# Patient Record
Sex: FEMALE | Race: BLACK OR AFRICAN AMERICAN | NOT HISPANIC OR LATINO | URBAN - METROPOLITAN AREA
[De-identification: names, ages, dates, MRNs, and addresses within clinical notes are randomized per-mention and may not be internally consistent; named-entity substitution may affect disease eponyms.]

---

## 2023-12-08 ENCOUNTER — INPATIENT (INPATIENT)
Facility: HOSPITAL | Age: 43
LOS: 6 days | Discharge: HOME | DRG: 95 | End: 2023-12-15
Attending: PSYCHIATRY & NEUROLOGY | Admitting: PSYCHIATRY & NEUROLOGY
Payer: COMMERCIAL

## 2023-12-08 VITALS
TEMPERATURE: 99 F | WEIGHT: 156.97 LBS | SYSTOLIC BLOOD PRESSURE: 127 MMHG | HEART RATE: 81 BPM | RESPIRATION RATE: 18 BRPM | DIASTOLIC BLOOD PRESSURE: 88 MMHG | OXYGEN SATURATION: 99 % | HEIGHT: 67 IN

## 2023-12-08 DIAGNOSIS — G62.9 POLYNEUROPATHY, UNSPECIFIED: ICD-10-CM

## 2023-12-08 LAB
A1C WITH ESTIMATED AVERAGE GLUCOSE RESULT: 5.6 % — SIGNIFICANT CHANGE UP (ref 4–5.6)
A1C WITH ESTIMATED AVERAGE GLUCOSE RESULT: 5.6 % — SIGNIFICANT CHANGE UP (ref 4–5.6)
ALBUMIN SERPL ELPH-MCNC: 4.5 G/DL — SIGNIFICANT CHANGE UP (ref 3.3–5)
ALBUMIN SERPL ELPH-MCNC: 4.5 G/DL — SIGNIFICANT CHANGE UP (ref 3.3–5)
ALP SERPL-CCNC: 70 U/L — SIGNIFICANT CHANGE UP (ref 40–120)
ALP SERPL-CCNC: 70 U/L — SIGNIFICANT CHANGE UP (ref 40–120)
ALT FLD-CCNC: 27 U/L — SIGNIFICANT CHANGE UP (ref 10–45)
ALT FLD-CCNC: 27 U/L — SIGNIFICANT CHANGE UP (ref 10–45)
ANION GAP SERPL CALC-SCNC: 17 MMOL/L — SIGNIFICANT CHANGE UP (ref 5–17)
ANION GAP SERPL CALC-SCNC: 17 MMOL/L — SIGNIFICANT CHANGE UP (ref 5–17)
AST SERPL-CCNC: 30 U/L — SIGNIFICANT CHANGE UP (ref 10–40)
AST SERPL-CCNC: 30 U/L — SIGNIFICANT CHANGE UP (ref 10–40)
BASOPHILS # BLD AUTO: 0.03 K/UL — SIGNIFICANT CHANGE UP (ref 0–0.2)
BASOPHILS # BLD AUTO: 0.03 K/UL — SIGNIFICANT CHANGE UP (ref 0–0.2)
BASOPHILS NFR BLD AUTO: 0.6 % — SIGNIFICANT CHANGE UP (ref 0–2)
BASOPHILS NFR BLD AUTO: 0.6 % — SIGNIFICANT CHANGE UP (ref 0–2)
BILIRUB SERPL-MCNC: 0.4 MG/DL — SIGNIFICANT CHANGE UP (ref 0.2–1.2)
BILIRUB SERPL-MCNC: 0.4 MG/DL — SIGNIFICANT CHANGE UP (ref 0.2–1.2)
BUN SERPL-MCNC: 17 MG/DL — SIGNIFICANT CHANGE UP (ref 7–23)
BUN SERPL-MCNC: 17 MG/DL — SIGNIFICANT CHANGE UP (ref 7–23)
CALCIUM SERPL-MCNC: 9.5 MG/DL — SIGNIFICANT CHANGE UP (ref 8.4–10.5)
CALCIUM SERPL-MCNC: 9.5 MG/DL — SIGNIFICANT CHANGE UP (ref 8.4–10.5)
CHLORIDE SERPL-SCNC: 105 MMOL/L — SIGNIFICANT CHANGE UP (ref 96–108)
CHLORIDE SERPL-SCNC: 105 MMOL/L — SIGNIFICANT CHANGE UP (ref 96–108)
CO2 SERPL-SCNC: 21 MMOL/L — LOW (ref 22–31)
CO2 SERPL-SCNC: 21 MMOL/L — LOW (ref 22–31)
CREAT SERPL-MCNC: 0.81 MG/DL — SIGNIFICANT CHANGE UP (ref 0.5–1.3)
CREAT SERPL-MCNC: 0.81 MG/DL — SIGNIFICANT CHANGE UP (ref 0.5–1.3)
EGFR: 92 ML/MIN/1.73M2 — SIGNIFICANT CHANGE UP
EGFR: 92 ML/MIN/1.73M2 — SIGNIFICANT CHANGE UP
EOSINOPHIL # BLD AUTO: 0.03 K/UL — SIGNIFICANT CHANGE UP (ref 0–0.5)
EOSINOPHIL # BLD AUTO: 0.03 K/UL — SIGNIFICANT CHANGE UP (ref 0–0.5)
EOSINOPHIL NFR BLD AUTO: 0.6 % — SIGNIFICANT CHANGE UP (ref 0–6)
EOSINOPHIL NFR BLD AUTO: 0.6 % — SIGNIFICANT CHANGE UP (ref 0–6)
ESTIMATED AVERAGE GLUCOSE: 114 MG/DL — SIGNIFICANT CHANGE UP (ref 68–114)
ESTIMATED AVERAGE GLUCOSE: 114 MG/DL — SIGNIFICANT CHANGE UP (ref 68–114)
FLUAV AG NPH QL: SIGNIFICANT CHANGE UP
FLUAV AG NPH QL: SIGNIFICANT CHANGE UP
FLUBV AG NPH QL: SIGNIFICANT CHANGE UP
FLUBV AG NPH QL: SIGNIFICANT CHANGE UP
GLUCOSE SERPL-MCNC: 102 MG/DL — HIGH (ref 70–99)
GLUCOSE SERPL-MCNC: 102 MG/DL — HIGH (ref 70–99)
HCT VFR BLD CALC: 42.8 % — SIGNIFICANT CHANGE UP (ref 34.5–45)
HCT VFR BLD CALC: 42.8 % — SIGNIFICANT CHANGE UP (ref 34.5–45)
HGB BLD-MCNC: 14 G/DL — SIGNIFICANT CHANGE UP (ref 11.5–15.5)
HGB BLD-MCNC: 14 G/DL — SIGNIFICANT CHANGE UP (ref 11.5–15.5)
IMM GRANULOCYTES NFR BLD AUTO: 0.2 % — SIGNIFICANT CHANGE UP (ref 0–0.9)
IMM GRANULOCYTES NFR BLD AUTO: 0.2 % — SIGNIFICANT CHANGE UP (ref 0–0.9)
LYMPHOCYTES # BLD AUTO: 2.12 K/UL — SIGNIFICANT CHANGE UP (ref 1–3.3)
LYMPHOCYTES # BLD AUTO: 2.12 K/UL — SIGNIFICANT CHANGE UP (ref 1–3.3)
LYMPHOCYTES # BLD AUTO: 45.7 % — HIGH (ref 13–44)
LYMPHOCYTES # BLD AUTO: 45.7 % — HIGH (ref 13–44)
MAGNESIUM SERPL-MCNC: 2.2 MG/DL — SIGNIFICANT CHANGE UP (ref 1.6–2.6)
MAGNESIUM SERPL-MCNC: 2.2 MG/DL — SIGNIFICANT CHANGE UP (ref 1.6–2.6)
MCHC RBC-ENTMCNC: 29.1 PG — SIGNIFICANT CHANGE UP (ref 27–34)
MCHC RBC-ENTMCNC: 29.1 PG — SIGNIFICANT CHANGE UP (ref 27–34)
MCHC RBC-ENTMCNC: 32.7 GM/DL — SIGNIFICANT CHANGE UP (ref 32–36)
MCHC RBC-ENTMCNC: 32.7 GM/DL — SIGNIFICANT CHANGE UP (ref 32–36)
MCV RBC AUTO: 89 FL — SIGNIFICANT CHANGE UP (ref 80–100)
MCV RBC AUTO: 89 FL — SIGNIFICANT CHANGE UP (ref 80–100)
MONOCYTES # BLD AUTO: 0.46 K/UL — SIGNIFICANT CHANGE UP (ref 0–0.9)
MONOCYTES # BLD AUTO: 0.46 K/UL — SIGNIFICANT CHANGE UP (ref 0–0.9)
MONOCYTES NFR BLD AUTO: 9.9 % — SIGNIFICANT CHANGE UP (ref 2–14)
MONOCYTES NFR BLD AUTO: 9.9 % — SIGNIFICANT CHANGE UP (ref 2–14)
NEUTROPHILS # BLD AUTO: 1.99 K/UL — SIGNIFICANT CHANGE UP (ref 1.8–7.4)
NEUTROPHILS # BLD AUTO: 1.99 K/UL — SIGNIFICANT CHANGE UP (ref 1.8–7.4)
NEUTROPHILS NFR BLD AUTO: 43 % — SIGNIFICANT CHANGE UP (ref 43–77)
NEUTROPHILS NFR BLD AUTO: 43 % — SIGNIFICANT CHANGE UP (ref 43–77)
NRBC # BLD: 0 /100 WBCS — SIGNIFICANT CHANGE UP (ref 0–0)
NRBC # BLD: 0 /100 WBCS — SIGNIFICANT CHANGE UP (ref 0–0)
PHOSPHATE SERPL-MCNC: 4.1 MG/DL — SIGNIFICANT CHANGE UP (ref 2.5–4.5)
PHOSPHATE SERPL-MCNC: 4.1 MG/DL — SIGNIFICANT CHANGE UP (ref 2.5–4.5)
PLATELET # BLD AUTO: 249 K/UL — SIGNIFICANT CHANGE UP (ref 150–400)
PLATELET # BLD AUTO: 249 K/UL — SIGNIFICANT CHANGE UP (ref 150–400)
POTASSIUM SERPL-MCNC: 3.7 MMOL/L — SIGNIFICANT CHANGE UP (ref 3.5–5.3)
POTASSIUM SERPL-MCNC: 3.7 MMOL/L — SIGNIFICANT CHANGE UP (ref 3.5–5.3)
POTASSIUM SERPL-SCNC: 3.7 MMOL/L — SIGNIFICANT CHANGE UP (ref 3.5–5.3)
POTASSIUM SERPL-SCNC: 3.7 MMOL/L — SIGNIFICANT CHANGE UP (ref 3.5–5.3)
PROCALCITONIN SERPL-MCNC: 0.04 NG/ML — SIGNIFICANT CHANGE UP (ref 0.02–0.1)
PROCALCITONIN SERPL-MCNC: 0.04 NG/ML — SIGNIFICANT CHANGE UP (ref 0.02–0.1)
PROT SERPL-MCNC: 7.6 G/DL — SIGNIFICANT CHANGE UP (ref 6–8.3)
PROT SERPL-MCNC: 7.6 G/DL — SIGNIFICANT CHANGE UP (ref 6–8.3)
RBC # BLD: 4.81 M/UL — SIGNIFICANT CHANGE UP (ref 3.8–5.2)
RBC # BLD: 4.81 M/UL — SIGNIFICANT CHANGE UP (ref 3.8–5.2)
RBC # FLD: 13.2 % — SIGNIFICANT CHANGE UP (ref 10.3–14.5)
RBC # FLD: 13.2 % — SIGNIFICANT CHANGE UP (ref 10.3–14.5)
RSV RNA NPH QL NAA+NON-PROBE: SIGNIFICANT CHANGE UP
RSV RNA NPH QL NAA+NON-PROBE: SIGNIFICANT CHANGE UP
SARS-COV-2 RNA SPEC QL NAA+PROBE: SIGNIFICANT CHANGE UP
SARS-COV-2 RNA SPEC QL NAA+PROBE: SIGNIFICANT CHANGE UP
SODIUM SERPL-SCNC: 143 MMOL/L — SIGNIFICANT CHANGE UP (ref 135–145)
SODIUM SERPL-SCNC: 143 MMOL/L — SIGNIFICANT CHANGE UP (ref 135–145)
TSH SERPL-MCNC: 1.53 UIU/ML — SIGNIFICANT CHANGE UP (ref 0.27–4.2)
TSH SERPL-MCNC: 1.53 UIU/ML — SIGNIFICANT CHANGE UP (ref 0.27–4.2)
WBC # BLD: 4.64 K/UL — SIGNIFICANT CHANGE UP (ref 3.8–10.5)
WBC # BLD: 4.64 K/UL — SIGNIFICANT CHANGE UP (ref 3.8–10.5)
WBC # FLD AUTO: 4.64 K/UL — SIGNIFICANT CHANGE UP (ref 3.8–10.5)
WBC # FLD AUTO: 4.64 K/UL — SIGNIFICANT CHANGE UP (ref 3.8–10.5)

## 2023-12-08 PROCEDURE — 99285 EMERGENCY DEPT VISIT HI MDM: CPT

## 2023-12-08 RX ORDER — ENOXAPARIN SODIUM 100 MG/ML
40 INJECTION SUBCUTANEOUS EVERY 24 HOURS
Refills: 0 | Status: DISCONTINUED | OUTPATIENT
Start: 2023-12-08 | End: 2023-12-11

## 2023-12-08 NOTE — ED PROVIDER NOTE - ATTENDING CONTRIBUTION TO CARE
------------ATTENDING NOTE------------   pt c/o 2 wks of constant gradually worsening bilateral numbness/paresthesia to lower extremities, no pain, feels "weird" w/ slight difficulty walking, no subjective weakness, no additional loss of function, no recent fevers/illness, 5/5 w/ +2 DTR BLE w/ nml gait, no DM or significant alcohol use or limited diet, awaiting labs/Neuro consult -->  - Natan Shaffer MD   --------------------------------------------- ------------ATTENDING NOTE------------   pt c/o 2 wks of constant gradually worsening bilateral numbness/paresthesia to lower extremities, no pain, feels "weird" w/ slight difficulty walking, no subjective weakness, no additional loss of function, no recent fevers/illness, 5/5 w/ +2 DTR BLE w/ nml gait, no DM or significant alcohol use or limited diet, awaiting labs/Neuro consult --> remained stable, admitting for continued filiberto hall, optimize medical mgmt, Neuro recs.  - Natan Shaffer MD   ---------------------------------------------

## 2023-12-08 NOTE — H&P ADULT - NSHPLABSRESULTS_GEN_ALL_CORE
Labs:                        14.0   4.64  )-----------( 249      ( 08 Dec 2023 15:36 )             42.8     12-08    143  |  105  |  17  ----------------------------<  102<H>  3.7   |  21<L>  |  0.81    Ca    9.5      08 Dec 2023 15:36  Phos  4.1     12-08  Mg     2.2     12-08    TPro  7.6  /  Alb  4.5  /  TBili  0.4  /  DBili  x   /  AST  30  /  ALT  27  /  AlkPhos  70  12-08    CAPILLARY BLOOD GLUCOSE        LIVER FUNCTIONS - ( 08 Dec 2023 15:36 )  Alb: 4.5 g/dL / Pro: 7.6 g/dL / ALK PHOS: 70 U/L / ALT: 27 U/L / AST: 30 U/L / GGT: x               CSF:                  Radiology:

## 2023-12-08 NOTE — ED PROVIDER NOTE - OBJECTIVE STATEMENT
44 y/o F PMHx GBS (26 years ago) presenting for b/l tingling in her feet and numbness in her hands for the past week. Patient states she saw a neurologist Dr. Tomasa Branham, DO today who sent her in to the ER for "GBS r/o" (per the paper prescription brought in by the patient). I left a message with her office for more information as to what testing she wanted done. Patient states this feels similar to the beginning of her GBS many years ago, however she states she does not have any weakness right now, but believes weakness only came on later last time after the sensation in her feet spread further.   Family history of T2DM in her father with diabetic nephropathy and in mother.   Denies any recent illnesses, fevers.  Patient denies any fevers, chills, nausea, vomiting, chest pain, diarrhea, constipation, painful urination, new rashes.

## 2023-12-08 NOTE — ED PROVIDER NOTE - PHYSICAL EXAMINATION
GENERAL: NAD  HEENT:  Atraumatic  CHEST/LUNG: Chest rise equal bilaterally  HEART: Regular rate and rhythm  ABDOMEN: Soft, Nontender, Nondistended  EXTREMITIES:  Extremities warm  PSYCH: A&Ox3  SKIN: No obvious rashes or lesions  MSK: No cervical spine TTP, able to range neck to the left and right/ No midline spinal TTP/ No swelling, redness, pain or discharge from   NEUROLOGY: strength and sensation intact in all extremities. CN 2 - 12 intact. Finger to nose test intact. No pronator drift. Ambulatory without difficulty.

## 2023-12-08 NOTE — H&P ADULT - NSICDXPASTMEDICALHX_GEN_ALL_CORE_FT
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

## 2023-12-08 NOTE — ED PROVIDER NOTE - PROGRESS NOTE DETAILS
Dakota Garcia DO (PGY-1) Neuro consulted dt cf GBS Dakota Garcia DO (PGY-1) Patient reevaluated at bedside. Patient is doing well. Neuro will admit patient to their service. Patient notified and amenable with plan.

## 2023-12-08 NOTE — H&P ADULT - ASSESSMENT
Ileana Santana is a 43-year-old woman, with PMH significant for AIDP and abnormal Pap smear - treated with LEEP, who presents to [facility]  ED on 12/8/2023, at the behest of her outpatient neurologist, with c/o b/l tingling and numbness in her feet and fingertips. 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 Reports current symptoms are similar to her AIDP symptoms. Previous course of AIDP began with tingling/numbness in her feet. Despite early use of PLEX - patient progressed to have further leg numbness/weakness, as well as arm weakness. No respiratory involvement per patient report. Denies recent illness including diarrheal illness. No recent vaccines. Not on any medications per her report. ED vitals notable for: Tmax 37.6, HR to 81, BP to 142/83, RR 18, SpO2 % on RA. Labs thus far are unrevealing. Exam notable for impaired STM, abnormal plantar response b/l, c/o reduced sensation to LT/pinprick + paresthesias: feet, legs, fingertips. No ophthalmoplegia, ataxia, reflexes are largely intact.    Impression: Paresthesias/numbness affecting the bilateral hands/feet in a patient with previous diagnosis of AIDP (treated with plasma exchange) - r/o AIDP, r/o other demyelinating disease    Plan:  [] Admit to [facility] Neurology Floor  [] Baseline NIF/VC  [] Labs: CBC, CMP, ganglioside Abs panel, paraneoplastic autoantibody panel, A1c, lipid panel, UA, UTox, ferritin, total iron with TIBC, ACE, vitamin B1/B6/B12/D/E, folate, Lyme, WNV, copper, zinc, CK, ESR, CRP, TSH, T3/T4, RPR, RVP  [] Follow up urine HCG  [] Imaging: MR C/T/L spine w/wo IV contrast  [] Based on laboratory and radiologic findings, will consider lumbar puncture  [] Reached out to Dr. Wong's office - left message. If no response this weekend, please reach out on Monday with an update.  [] PT/OT  [] CM/SW  [] Diet: Regular  [] DVT prophylaxis: enoxaparin 40mg subcutaneous q24h    Patient/plan d/w Dr. Baird. To be seen by General Neurology attending in the AM with attestation to follow. Plan is not formalized until attending attestation is complete. Ileana Santana is a 43-year-old woman, with PMH significant for AIDP and abnormal Pap smear - treated with LEEP, who presents to [facility]  ED on 12/8/2023, at the behest of her outpatient neurologist, with c/o b/l tingling and numbness in her feet and fingertips. 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 Reports current symptoms are similar to her AIDP symptoms. Previous course of AIDP began with tingling/numbness in her feet. Despite early use of PLEX - patient progressed to have further leg numbness/weakness, as well as arm weakness. No respiratory involvement per patient report. Denies recent illness including diarrheal illness. No recent vaccines. Not on any medications per her report. ED vitals notable for: Tmax 37.6, HR to 81, BP to 142/83, RR 18, SpO2 % on RA. Labs thus far are unrevealing. Exam notable for impaired STM, abnormal plantar response b/l, c/o reduced sensation to LT/pinprick + paresthesias: feet, legs, fingertips. No ophthalmoplegia, ataxia, reflexes are largely intact.    Impression: Paresthesias/numbness affecting the bilateral hands/feet in a patient with previous diagnosis of AIDP (treated with plasma exchange) - r/o AIDP, r/o other demyelinating disease    Plan:  [] Admit to [facility] Neurology Floor  [] Baseline NIF/VC  [] Labs: CBC, CMP, ganglioside Abs panel, paraneoplastic autoantibody panel, A1c, lipid panel, UA, UTox, ferritin, total iron with TIBC, ACE, vitamin B1/B6/B12/D/E, folate, Lyme, WNV, copper, zinc, CK, ESR, CRP, TSH, T3/T4, RPR, RVP  [] Follow up urine HCG  [] Imaging: MR C/T/L spine w/wo IV contrast  [] Based on laboratory and radiologic findings, will consider lumbar puncture  [] Reached out to Dr. Wong's office - left message. If no response this weekend, please reach out on Monday with an update.  [] PT/OT  [] CM/SW  [] Diet: Regular  [] DVT prophylaxis: enoxaparin 40mg subcutaneous q24h    Patient/plan d/w Dr. Baird. To be seen by General Neurology attending in the AM with attestation to follow. Plan is not formalized until attending attestation is complete.

## 2023-12-08 NOTE — ED ADULT NURSE NOTE - NSFALLUNIVINTERV_ED_ALL_ED
Bed/Stretcher in lowest position, wheels locked, appropriate side rails in place/Call bell, personal items and telephone in reach/Instruct patient to call for assistance before getting out of bed/chair/stretcher/Non-slip footwear applied when patient is off stretcher/[place] to call system/Physically safe environment - no spills, clutter or unnecessary equipment/Purposeful proactive rounding/Room/bathroom lighting operational, light cord in reach Bed/Stretcher in lowest position, wheels locked, appropriate side rails in place/Call bell, personal items and telephone in reach/Instruct patient to call for assistance before getting out of bed/chair/stretcher/Non-slip footwear applied when patient is off stretcher/[place] to call system/Physically safe environment - no spills, clutter or unnecessary equipment/Purposeful proactive rounding/Room/bathroom lighting operational, light cord in reach

## 2023-12-08 NOTE — ED PROVIDER NOTE - INTERPRETATION
normal sinus rhythm, Normal axis, Normal MT interval and QRS complex. There are no acute ischemic ST or T-wave changes. normal sinus rhythm, Normal axis, Normal DC interval and QRS complex. There are no acute ischemic ST or T-wave changes.

## 2023-12-08 NOTE — ED ADULT NURSE NOTE - OBJECTIVE STATEMENT
Pt is a 43y F p/w b/l LE tingling. Pt reports b/l LE tingling progressing up lower extremities x 1 week with some pain on ambulation. Pt also endorsing slightly decreased sensation and tingling in b/l fingertips. Pt has a remote h/o guillain barre which required intubation at 17 years of age. Reporting mild ALONZO. Denies fevers, recent viral illness, N/V/D, muscle spasms, cough. A&Ox4, PETERS, lungs clear, distal pulses intact, abdomen soft, skin intact. Side rails up for safety, call bell and personal items within reach, instructed to call for assistance, verbalizes understanding. Will continue to monitor.

## 2023-12-08 NOTE — ED PROVIDER NOTE - CLINICAL SUMMARY MEDICAL DECISION MAKING FREE TEXT BOX
42 y/o F PMHx GBS (26 years ago) presenting for b/l tingling in her feet and numbness in her hands for the past week. Patient states she saw a neurologist Dr. Tomasa Branham, DO today who sent her in to the ER for "GBS r/o" (per the paper prescription brought in by the patient). I left a message with her office for more information as to what testing she wanted done. Patient states this feels similar to the beginning of her GBS many years ago, however she states she does not have any weakness right now, but believes weakness only came on later last time after the sensation in her feet spread further.   Denies any recent illnesses, fevers.  VSS  On exam patient has neuro exam wnl with sensation intact in hands and feet DTRs intact.   DDx includes but not limited to peripheral neuropathy. 44 y/o F PMHx GBS (26 years ago) presenting for b/l tingling in her feet and numbness in her hands for the past week. Patient states she saw a neurologist Dr. Tomasa Branham, DO today who sent her in to the ER for "GBS r/o" (per the paper prescription brought in by the patient). I left a message with her office for more information as to what testing she wanted done. Patient states this feels similar to the beginning of her GBS many years ago, however she states she does not have any weakness right now, but believes weakness only came on later last time after the sensation in her feet spread further.   Denies any recent illnesses, fevers.  VSS  On exam patient has neuro exam wnl with sensation intact in hands and feet DTRs intact.   DDx includes but not limited to peripheral neuropathy vs GBS 42 y/o F PMHx GBS (26 years ago) presenting for b/l tingling in her feet and numbness in her hands for the past week. Patient states she saw a neurologist Dr. Tomasa Branham, DO today who sent her in to the ER for "GBS r/o" (per the paper prescription brought in by the patient). I left a message with her office for more information as to what testing she wanted done. Patient states this feels similar to the beginning of her GBS many years ago, however she states she does not have any weakness right now, but believes weakness only came on later last time after the sensation in her feet spread further.   Denies any recent illnesses, fevers.  VSS  On exam patient has neuro exam wnl with sensation intact in hands and feet DTRs intact.   DDx includes but not limited to peripheral neuropathy vs GBS see MD note

## 2023-12-08 NOTE — H&P ADULT - NSHPPHYSICALEXAM_GEN_ALL_CORE
Vitals:  T(C): 37.6 (12-08-23 @ 15:16), Max: 37.6 (12-08-23 @ 15:16)  HR: 66 (12-08-23 @ 15:16) (66 - 81)  BP: 142/83 (12-08-23 @ 15:16) (127/88 - 142/83)  RR: 18 (12-08-23 @ 15:16) (18 - 18)  SpO2: 100% (12-08-23 @ 15:16) (99% - 100%)    Physical Examination:  General - Sitting up on edge of ED cart, on her phone, NAD, appears younger than stated age  Cardiovascular - No LE edema, extremities are warm/well-perfused  Eyes - Non-injected conjunctiva, anicteric sclera    Neurologic Exam:  Mental status:  Awake, Alert; Oriented to: person, place, and time; Speech: fluent; Repetition and naming: intact; Follows simple commands, performed 3-step command incorrectly; Attention/concentration: spells WORLD correctly forward - struggles to spell backward but eventually does successfully; Recent and remote memory (including registration and recall): registration intact, 1/3 on 3-word recall - even after category cues; Fund of knowledge: knows current president, knows COVID-19, struggles to tell me how many quarters in $1.75.    Cranial nerves - PERRL, VFF on confrontational testing, EOMI - no nystagmus, no pain with movements, face sensation (V1-V3) intact b/l, facial strength intact without asymmetry b/l, hearing intact b/l with finger rub test, palate with symmetric elevation, trapezius 5/5 strength b/l, tongue midline on protrusion with full lateral movement    Motor - Normal bulk and tone throughout. No pronator drift.  Strength testing (R/L)  Deltoid:  5/5   Biceps:  5/5   Triceps:  5/5   Wrist Extension:  5/5   Wrist Flexion:  5/5   Interossei:  5/5   :  5/5  Hip Flexion:  5/5   Hip Extension:  5/5   Knee Flexion:  5/5   Knee Extension:  5/5   Dorsiflexion:  5/5   Plantar Flexion:  5/5    Sensation - Light touch/vibration intact throughout - except feet, legs, fingertips, which are with reduced sensation to light touch and pinprick    DTRs (R/L)  Biceps:  2+/2+   Triceps:  2+/2+   Brachioradialis:  2+/2+   Patellar:  2+/2+   Ankle:  0/0   Toes/plantar response:  Up/Up    Coordination - Finger to Nose, Heel to Shin, JONATHAN intact b/l. No tremors appreciated.    Gait and station - Normal casual gait. Reported some unsteadiness while performing Romberg test.

## 2023-12-08 NOTE — ED ADULT NURSE NOTE - CHIEF COMPLAINT QUOTE
tingling bilat feet tips of finger tips since Thursday last week Has H/O Guillain [place] at 17 yrs of age  Amb to ED Resp even and nonlab  Worsening pain upon ambulation tingling bilat feet tips of finger tips since Thursday last week Has H/O Guillain [place] at 17 yrs of age  Amb to ED Resp even and nonlab  Worsening pain upon ambulation

## 2023-12-08 NOTE — H&P ADULT - HISTORY OF PRESENT ILLNESS
Patient MICHELLE ARIAS is a 43y (1980) RIGHT handed woman who presented to [facility] ED on 12/8/2023, with c/o tingling in the b/l fingertips and feet since Thursday (11/30/2023).    PMH significant for: AIDP (age 17, when pregnant, treated with plasma exchange), history of abnormal Pap smear s/p LEEP    Patient reports to [facility] emergency department at the behest of her outpatient neurologist, Dr. Wong.  Patient reports that today was her first visit with Dr. Wong.  Patient had seen her primary care physician earlier in the week and s/he had referred her to an outpatient neurologist.  This neurologist was unable to see patient until 1/2024, so she found Dr. Wong online.  Patient is seeing an outpatient neurologist because she has been having numbness and tingling in her feet and hands, ongoing since Thursday (11/30/2023) of last week.  Patient reports that she started her menses on 11/30 and was dealing with cramping.  She called off from work and was in bed all day.  She says she began feeling numbness and tingling in her feet, namely in her toes.  She describes this as reduced sensation as well as pins-and-needles.  She says the sensation is akin to your foot falling asleep and trying to wake up.  Patient says that since then she has had worsening numbness on the plantar surfaces of her feet, the dorsal surfaces of her feet, and she is beginning to feel numbness in the lower legs.  She says that the toes are the worst, followed by the plantar surfaces of her feet, then the dorsal surfaces, and then the legs.  Patient says earlier this week she began feeling numbness and tingling in the tips of her fingers. Reports that her feet feel cold. Symptoms are during the entire day - not worse at night.    Patient is particularly concerned about her symptoms because she had a diagnosis of GBS at age 17.  Patient reports that the suspected trigger was pregnancy.  The pregnancy was ultimately terminated.  Patient reports that she underwent blood work, MRI, lumbar puncture.  She was admitted to the hospital for a week and a half, where she underwent plasma exchange.  She reports that she then spent 1.5 months in physical therapy to fully recover. Says she has always had balance issues since the initial episode.  She reports that her symptoms of the initial episode began similarly with numbness and tingling in her feet.  She began plasma exchange relatively quickly.  However, she reports that by the end of the plasma exchange, the numbness had spread up her legs and she was appreciably weak in both of her legs and her arms after plasma exchange.  Patient reports that there is no chance that she could be pregnant right now.      Patient denies a history of any medical problems other than a history of AIDP and abnormal Pap treated with LEEP procedure. Denies history of stroke/MI.  Reports she does not take any medications.  She denies use of blood thinners or aspirin.  She does not use any assistive devices at baseline.  She is able to climb stairs without issues.  Patient reports that she drives.  Patient does not use tobacco.  Patient drinks socially.  Patient denies use of recreational drugs.  Patient reports that she lives alone.  She has no children.  Patient works as a  at [employer]. Patient denies a family history of AIDP.  There is no history of demyelinating diseases in her family to her knowledge.  She denies family history of rheumatologic issues.    Patient denies any recent illness including diarrheal illness.  She has not received any recent vaccines.  She has not had any recent travel, no sick contacts.  No changes in medications.  Patient reports that she had a LEEP procedure earlier this year for an abnormal Pap smear, otherwise she has had no new medical issues prior to this past week.  She does not regularly see a physician.   Patient denies fevers and chills. Denies headache, neck pain, chest pain, shortness of breath, abdominal pain, nausea, vomiting, diarrhea, dysuria, frequency, hematuria.  Reports normal bowel movements.  Reports normal urination. Patient MICHELLE ARIAS is a 43y (1980) RIGHT handed woman who presented to [facility] ED on 12/8/2023, with c/o tingling in the b/l fingertips and feet since Thursday (11/30/2023).    PMH significant for: AIDP (age 17, when pregnant, treated with plasma exchange), history of abnormal Pap smear s/p LEEP    Patient reports to [facility] emergency department at the behest of her outpatient neurologist, Dr. Wong.  Patient reports that today was her first visit with Dr. Wong.  Patient had seen her primary care physician earlier in the week and s/he had referred her to an outpatient neurologist.  This neurologist was unable to see patient until 1/2024, so she found Dr. Wong online.  Patient is seeing an outpatient neurologist because she has been having numbness and tingling in her feet and hands, ongoing since Thursday (11/30/2023) of last week.  Patient reports that she started her menses on 11/30 and was dealing with cramping.  She called off from work and was in bed all day.  She says she began feeling numbness and tingling in her feet, namely in her toes.  She describes this as reduced sensation as well as pins-and-needles.  She says the sensation is akin to your foot falling asleep and trying to wake up.  Patient says that since then she has had worsening numbness on the plantar surfaces of her feet, the dorsal surfaces of her feet, and she is beginning to feel numbness in the lower legs.  She says that the toes are the worst, followed by the plantar surfaces of her feet, then the dorsal surfaces, and then the legs.  Patient says earlier this week she began feeling numbness and tingling in the tips of her fingers. Reports that her feet feel cold. Symptoms are during the entire day - not worse at night.    Patient is particularly concerned about her symptoms because she had a diagnosis of GBS at age 17.  Patient reports that the suspected trigger was pregnancy.  The pregnancy was ultimately terminated.  Patient reports that she underwent blood work, MRI, lumbar puncture.  She was admitted to the hospital for a week and a half, where she underwent plasma exchange.  She reports that she then spent 1.5 months in physical therapy to fully recover. Says she has always had balance issues since the initial episode.  She reports that her symptoms of the initial episode began similarly with numbness and tingling in her feet.  She began plasma exchange relatively quickly.  However, she reports that by the end of the plasma exchange, the numbness had spread up her legs and she was appreciably weak in both of her legs and her arms after plasma exchange.  Patient reports that there is no chance that she could be pregnant right now.      Patient denies a history of any medical problems other than a history of AIDP and abnormal Pap treated with LEEP procedure. Denies history of stroke/MI.  Reports she does not take any medications.  She denies use of blood thinners or aspirin.  She does not use any assistive devices at baseline.  She is able to climb stairs without issues.  Patient reports that she drives.  Patient does not use tobacco.  Patient drinks socially.  Patient denies use of recreational drugs.  Patient reports that she lives alone.  She has no children.  Patient works as a  at [employer]. Patient denies a family history of AIDP.  There is no history of demyelinating diseases in her family to her knowledge.  She denies family history of rheumatologic issues.    Patient denies any recent illness including diarrheal illness.  She has not received any recent vaccines.  She has not had any recent travel, no sick contacts.  No changes in medications.  Patient reports that she had a LEEP procedure earlier this year for an abnormal Pap smear, otherwise she has had no new medical issues prior to this past week.  She does not regularly see a physician.   Patient denies fevers and chills. Denies headache, neck pain, chest pain, shortness of breath, abdominal pain, nausea, vomiting, diarrhea, dysuria, frequency, hematuria.  Reports normal bowel movements.  Reports normal urination.

## 2023-12-08 NOTE — ED ADULT TRIAGE NOTE - CHIEF COMPLAINT QUOTE
tingling bilat feet tips of finger tips since Thursday last week Has H/O Jesse Aparicio at 17 yrs of age  Amb to ED Resp even and nonlab  Worsening pain upon ambulation tingling bilat feet tips of finger tips since Thursday last week Has H/O Guillain [place] at 17 yrs of age  Amb to ED Resp even and nonlab  Worsening pain upon ambulation tingling bilat feet tips of finger tips since Thursday last week Has H/O Guillain [place] at 17 yrs of age  Amb to ED Resp even and nonlab  Worsening pain upon ambulation

## 2023-12-09 LAB
AMPHET UR-MCNC: NEGATIVE — SIGNIFICANT CHANGE UP
AMPHET UR-MCNC: NEGATIVE — SIGNIFICANT CHANGE UP
APPEARANCE UR: CLEAR — SIGNIFICANT CHANGE UP
APPEARANCE UR: CLEAR — SIGNIFICANT CHANGE UP
BACTERIA # UR AUTO: ABNORMAL /HPF
BACTERIA # UR AUTO: ABNORMAL /HPF
BARBITURATES UR SCN-MCNC: NEGATIVE — SIGNIFICANT CHANGE UP
BARBITURATES UR SCN-MCNC: NEGATIVE — SIGNIFICANT CHANGE UP
BENZODIAZ UR-MCNC: NEGATIVE — SIGNIFICANT CHANGE UP
BENZODIAZ UR-MCNC: NEGATIVE — SIGNIFICANT CHANGE UP
BILIRUB UR-MCNC: NEGATIVE — SIGNIFICANT CHANGE UP
BILIRUB UR-MCNC: NEGATIVE — SIGNIFICANT CHANGE UP
CAST: 0 /LPF — SIGNIFICANT CHANGE UP (ref 0–4)
CAST: 0 /LPF — SIGNIFICANT CHANGE UP (ref 0–4)
COCAINE METAB.OTHER UR-MCNC: NEGATIVE — SIGNIFICANT CHANGE UP
COCAINE METAB.OTHER UR-MCNC: NEGATIVE — SIGNIFICANT CHANGE UP
COLOR SPEC: YELLOW — SIGNIFICANT CHANGE UP
COLOR SPEC: YELLOW — SIGNIFICANT CHANGE UP
DIFF PNL FLD: ABNORMAL
DIFF PNL FLD: ABNORMAL
GLUCOSE UR QL: NEGATIVE MG/DL — SIGNIFICANT CHANGE UP
GLUCOSE UR QL: NEGATIVE MG/DL — SIGNIFICANT CHANGE UP
HCG UR QL: NEGATIVE — SIGNIFICANT CHANGE UP
HCG UR QL: NEGATIVE — SIGNIFICANT CHANGE UP
KETONES UR-MCNC: NEGATIVE MG/DL — SIGNIFICANT CHANGE UP
KETONES UR-MCNC: NEGATIVE MG/DL — SIGNIFICANT CHANGE UP
LEUKOCYTE ESTERASE UR-ACNC: ABNORMAL
LEUKOCYTE ESTERASE UR-ACNC: ABNORMAL
METHADONE UR-MCNC: NEGATIVE — SIGNIFICANT CHANGE UP
METHADONE UR-MCNC: NEGATIVE — SIGNIFICANT CHANGE UP
NITRITE UR-MCNC: NEGATIVE — SIGNIFICANT CHANGE UP
NITRITE UR-MCNC: NEGATIVE — SIGNIFICANT CHANGE UP
OPIATES UR-MCNC: NEGATIVE — SIGNIFICANT CHANGE UP
OPIATES UR-MCNC: NEGATIVE — SIGNIFICANT CHANGE UP
OXYCODONE UR-MCNC: NEGATIVE — SIGNIFICANT CHANGE UP
OXYCODONE UR-MCNC: NEGATIVE — SIGNIFICANT CHANGE UP
PCP SPEC-MCNC: SIGNIFICANT CHANGE UP
PCP SPEC-MCNC: SIGNIFICANT CHANGE UP
PCP UR-MCNC: NEGATIVE — SIGNIFICANT CHANGE UP
PCP UR-MCNC: NEGATIVE — SIGNIFICANT CHANGE UP
PH UR: 6 — SIGNIFICANT CHANGE UP (ref 5–8)
PH UR: 6 — SIGNIFICANT CHANGE UP (ref 5–8)
PROT UR-MCNC: NEGATIVE MG/DL — SIGNIFICANT CHANGE UP
PROT UR-MCNC: NEGATIVE MG/DL — SIGNIFICANT CHANGE UP
RBC CASTS # UR COMP ASSIST: 6 /HPF — HIGH (ref 0–4)
RBC CASTS # UR COMP ASSIST: 6 /HPF — HIGH (ref 0–4)
REVIEW: SIGNIFICANT CHANGE UP
REVIEW: SIGNIFICANT CHANGE UP
SP GR SPEC: 1.02 — SIGNIFICANT CHANGE UP (ref 1–1.03)
SP GR SPEC: 1.02 — SIGNIFICANT CHANGE UP (ref 1–1.03)
SQUAMOUS # UR AUTO: 6 /HPF — HIGH (ref 0–5)
SQUAMOUS # UR AUTO: 6 /HPF — HIGH (ref 0–5)
THC UR QL: NEGATIVE — SIGNIFICANT CHANGE UP
THC UR QL: NEGATIVE — SIGNIFICANT CHANGE UP
UROBILINOGEN FLD QL: 0.2 MG/DL — SIGNIFICANT CHANGE UP (ref 0.2–1)
UROBILINOGEN FLD QL: 0.2 MG/DL — SIGNIFICANT CHANGE UP (ref 0.2–1)
WBC UR QL: 18 /HPF — HIGH (ref 0–5)
WBC UR QL: 18 /HPF — HIGH (ref 0–5)

## 2023-12-09 PROCEDURE — 99222 1ST HOSP IP/OBS MODERATE 55: CPT | Mod: GC

## 2023-12-09 PROCEDURE — 72157 MRI CHEST SPINE W/O & W/DYE: CPT | Mod: 26

## 2023-12-09 PROCEDURE — 72158 MRI LUMBAR SPINE W/O & W/DYE: CPT | Mod: 26

## 2023-12-09 PROCEDURE — 72156 MRI NECK SPINE W/O & W/DYE: CPT | Mod: 26

## 2023-12-09 RX ORDER — INFLUENZA VIRUS VACCINE 15; 15; 15; 15 UG/.5ML; UG/.5ML; UG/.5ML; UG/.5ML
0.5 SUSPENSION INTRAMUSCULAR ONCE
Refills: 0 | Status: DISCONTINUED | OUTPATIENT
Start: 2023-12-09 | End: 2023-12-15

## 2023-12-09 RX ADMIN — ENOXAPARIN SODIUM 40 MILLIGRAM(S): 100 INJECTION SUBCUTANEOUS at 06:43

## 2023-12-09 NOTE — CONSULT NOTE ADULT - ASSESSMENT
Patient is a 43 year old woman admitted 12/8/23 with  a over 1 week history since last Thursday, 11/30/23 of tingling involving the feet bilaterally which slowly progressed from the toes to the ankle. In addition after 3 days of tingling of the feet she noted tingling of the fingertips which also persisted. she denies weakness. She denies other neurological complaints. She denies neck, back, arm, or leg pain. She denies trauma, heavy lifting, fever.  She was seen by an outpatient neurologist, Dr. Wong and advised to go to the ER. Neurological exam as above.    1) MRI C Spine report pending  2) MRI T Spine report pending  3) MRI LS Spine report pending  4) ESR, C reactive protein,  KIESHA, RF, SPEP  5) TFT,  pending  6) ACE pending  7) Serum paraneoplastic panel pending  8) Rheumatology consult acute polyneuropathy  9) ObGyn consult abnormal PAP smear spring, 2023, HPV positive, S/P LEEP

## 2023-12-09 NOTE — PHYSICAL THERAPY INITIAL EVALUATION ADULT - PERTINENT HX OF CURRENT PROBLEM, REHAB EVAL
43-year-old woman, with PMH significant for AIDP and abnormal Pap smear - treated with LEEP, who presents to [facility]  ED on 12/8/2023, at the behest of her outpatient neurologist, with c/o b/l tingling and numbness in her feet and fingertips. 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 Reports current symptoms are similar to her AIDP symptoms. Previous course of AIDP began with tingling/numbness in her feet. Despite early use of PLEX - patient progressed to have further leg numbness/weakness, as well as arm weakness. No respiratory involvement per patient report. Denies recent illness including diarrheal illness. No recent vaccines. Not on any medications per her report. ED vitals notable for: Tmax 37.6, HR to 81, BP to 142/83, RR 18, SpO2 % on RA. Labs thus far are unrevealing. Exam notable for impaired STM, abnormal plantar response b/l, c/o reduced sensation to LT/pinprick + paresthesias: feet, legs, fingertips. No ophthalmoplegia, ataxia, reflexes are largely intact. Impression: Paresthesias/numbness affecting the bilateral hands/feet in a patient with previous diagnosis of AIDP (treated with plasma exchange) - r/o AIDP, r/o other demyelinating disease 43-year-old woman, with PMH significant for AIDP and abnormal Pap smear - treated with LEEP, who presents to [facility]  ED on 12/8/2023, at the behest of her outpatient neurologist, with c/o b/l tingling and numbness in her feet and fingertips. 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 Reports current symptoms are similar to her AIDP symptoms. Previous course of AIDP began with tingling/numbness in her feet. Despite early use of PLEX - patient progressed to have further leg numbness/weakness, as well as arm weakness. No respiratory involvement per patient report. Denies recent illness including diarrheal illness. No recent vaccines. Not on any medications per her report. ED vitals notable for: Tmax 37.6, HR to 81, BP to 142/83, RR 18, SpO2 % on RA. Labs thus far are unrevealing. Exam notable for impaired STM, abnormal plantar response b/l, c/o reduced sensation to LT/pinprick + paresthesias: feet, legs, fingertips. No ophthalmoplegia, ataxia, reflexes are largely intact. Impression: Paresthesias/numbness affecting the bilateral hands/feet in a patient with previous diagnosis of AIDP (treated with plasma exchange) - r/o AIDP, r/o other demyelinating disease

## 2023-12-09 NOTE — PATIENT PROFILE ADULT - FALL HARM RISK - UNIVERSAL INTERVENTIONS
Bed in lowest position, wheels locked, appropriate side rails in place/Call bell, personal items and telephone in reach/Instruct patient to call for assistance before getting out of bed or chair/Non-slip footwear when patient is out of bed/[place] to call system/Physically safe environment - no spills, clutter or unnecessary equipment/Purposeful Proactive Rounding/Room/bathroom lighting operational, light cord in reach Bed in lowest position, wheels locked, appropriate side rails in place/Call bell, personal items and telephone in reach/Instruct patient to call for assistance before getting out of bed or chair/Non-slip footwear when patient is out of bed/[place] to call system/Physically safe environment - no spills, clutter or unnecessary equipment/Purposeful Proactive Rounding/Room/bathroom lighting operational, light cord in reach

## 2023-12-09 NOTE — PATIENT PROFILE ADULT - NSPROGENSOURCEINFO_GEN_A_NUR
NP informed, repeat cbc and T/S ordered for midnight, will continue to monitor
NP aware. As per NP, await CBC with differential results prior to initiation Plt transfusion. Will continue to monitor.
Will continue to monitor as per NP request and pass along information to day RN.
continue on tele.
patient

## 2023-12-09 NOTE — PHYSICAL THERAPY INITIAL EVALUATION ADULT - ADDITIONAL COMMENTS
Pt was independent prior to admission living alone in pvt house FOS to bedroom, no dme, drives, works. Of note pt had Guillian Dallas at age 17 after pregnancy. Pt was independent prior to admission living alone in pvt house FOS to bedroom, no dme, drives, works. Of note pt had Guillian Brantley at age 17 after pregnancy.

## 2023-12-09 NOTE — CONSULT NOTE ADULT - ATTENDING COMMENTS
Likely GBS/AIDP and management for such per neurology   will check blood work for rheumatologic condition related to current presentation   Alma Chapman MD

## 2023-12-09 NOTE — CONSULT NOTE ADULT - SUBJECTIVE AND OBJECTIVE BOX
***consult has been received. note is in progress and incomplete without attending attestation***    Lise Phillip MD  PGY-4  Rheumatology Fellow  Reachable on TEAMS  [phone]    MICHELLE ARIAS  [id number]    HISTORY OF PRESENT ILLNESS:  43-year-old with history of GBS ( ~ 26 years ago) presented to [facility] ED on 2023, complaining of tingling in both fingertips and feet since Thursday (2023). 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  The patient has been experiencing persistent numbness and tingling in her feet and hands since 2023, coinciding with the onset of her menstrual cycle. The symptoms involve reduced sensation, pins-and-needles sensation, and a feeling similar to a foot falling asleep. The numbness has progressed from her toes to the plantar and dorsal surfaces of her feet, extending to the lower legs. Numbness has also developed in the fingertips, and the patient reports her feet feeling cold. Of particular concern to the patient is her history of Guillain-Barré Syndrome at age 17, suspected to be triggered by pregnancy, which was subsequently terminated. She underwent blood work, MRI, lumbar puncture, and plasma exchange during a week-and-a-half hospitalization, followed by 1.5 months of physical therapy for full recovery. The patient has experienced ongoing balance issues since the initial episode. During the GBS episode, numbness and tingling initially started in the feet, similar to the current presentation, but progressed rapidly after plasma exchange, leading to weakness in both legs and arms.  The patient denies a history of other medical problems, not on any medications. She works as a  at [employer]. She has no children, denies tobacco use, uses alcohol socially, and denies recreational drug use. There is no family history of autoimmune disease  The patient denies recent illness, recent vaccines, travel, sick contacts, and changes in medications. Denies fevers, chills, weight loss, night sweats, alopecia, sinus disease, asthma history, oral ulcers, dysphagia, vision changes/, Raynaud's, VTE, miscarriages sicca symptoms/urinary changes, edema, SOB, joint pain, swelling, jaw claudication/rash/photosensitivity/morning stiffness    MEDICATIONS  (STANDING):  enoxaparin Injectable 40 milliGRAM(s) SubCutaneous every 24 hours  influenza   Vaccine 0.5 milliLiter(s) IntraMuscular once    MEDICATIONS  (PRN):      Allergies    No Known Allergies    Intolerances        PERTINENT MEDICATION HISTORY:      Social History:  Works as a  at [employer]. Works from home. Lives alone. No children. (08 Dec 2023 17:06)      PAST MEDICAL & SURGICAL HISTORY:  GBS (Guillain-[place] syndrome)      No significant past surgical history    OCCUPATION:  TRAVEL HISTORY:    FAMILY HISTORY:      Vital Signs Last 24 Hrs  T(C): 36.9 (09 Dec 2023 17:05), Max: 36.9 (08 Dec 2023 23:58)  T(F): 98.4 (09 Dec 2023 17:05), Max: 98.5 (08 Dec 2023 23:58)  HR: 61 (09 Dec 2023 17:05) (58 - 68)  BP: 116/75 (09 Dec 2023 17:05) (115/76 - 130/85)  BP(mean): --  RR: 18 (09 Dec 2023 17:05) (18 - 18)  SpO2: 100% (09 Dec 2023 17:05) (97% - 100%)    Parameters below as of 09 Dec 2023 17:05  Patient On (Oxygen Delivery Method): room air      Physical Exam:  General: No apparent distress  HEENT: EOMI, MMM  CVS: +S1/S2, RRR, no murmurs/rubs/gallops  Resp: CTA b/l. No crackles/wheezing  GI: Soft, NT/ND +BS  MSK: no swelling/warmth/erythema of the joints of the UE/LE  Neuro: AAOx3, muscle exam normal, reduced sensation to light touch on feet, legs, fingertips,    Skin: no visible rashes    LABS:                        14.0   4.64  )-----------( 249      ( 08 Dec 2023 15:36 )             42.8     12-08    143  |  105  |  17  ----------------------------<  102<H>  3.7   |  21<L>  |  0.81    Ca    9.5      08 Dec 2023 15:36  Phos  4.1     12-08  Mg     2.2         TPro  7.6  /  Alb  4.5  /  TBili  0.4  /  DBili  x   /  AST  30  /  ALT  27  /  AlkPhos  70        Urinalysis Basic - ( 09 Dec 2023 18:20 )    Color: Yellow / Appearance: Clear / S.024 / pH: x  Gluc: x / Ketone: Negative mg/dL  / Bili: Negative / Urobili: 0.2 mg/dL   Blood: x / Protein: Negative mg/dL / Nitrite: Negative   Leuk Esterase: Moderate / RBC: x / WBC x   Sq Epi: x / Non Sq Epi: x / Bacteria: x      Rheumatology Work Up        RADIOLOGY & ADDITIONAL STUDIES:    < from: MR Lumbar Spine w/wo IV Cont (23 @ 11:28) >  Left foraminal disc herniation at the L5-S1 level.    < end of copied text >  < from: MR Thoracic Spine w/wo IV Cont (23 @ 11:27) >  IMPRESSION: No imaging evidence of Guillain [place] syndrome.    Left foraminal disc herniation at the L5-S1 level.      < end of copied text >      ?< from: MR Cervical Spine w/wo IV Cont (23 @ 11:27) >  IMPRESSION:    Abnormal enhancement along multiple bilateral cervical nerve roots with   dorsal distribution, thin/non-masslike and most consistent with a   polyneuritis.    Distribution is dorsal/sensory and fits symptoms as probable relapse of   GBS-sensory variant, versus other inflammatory condition. Correlate with   CSF sampling.    No cord compression, abnormal signal or definitive enhancement to   indicate myelitis.    < end of copied text >   ***consult has been received. note is in progress and incomplete without attending attestation***    Lise Phillip MD  PGY-4  Rheumatology Fellow  Re[employer] on TEAMS  [phone]    MICHELLE ARIAS  [id number]    HISTORY OF PRESENT ILLNESS:  43-year-old with history of GBS ( ~ 26 years ago) presented to [facility] ED on 2023, complaining of tingling in both fingertips and feet since Thursday (2023). 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  The patient has been experiencing persistent numbness and tingling in her feet and hands since 2023, coinciding with the onset of her menstrual cycle. The symptoms involve reduced sensation, pins-and-needles sensation, and a feeling similar to a foot falling asleep. The numbness has progressed from her toes to the plantar and dorsal surfaces of her feet, extending to the lower legs. Numbness has also developed in the fingertips, and the patient reports her feet feeling cold. Of particular concern to the patient is her history of Guillain-Barré Syndrome at age 17, suspected to be triggered by pregnancy, which was subsequently terminated. She underwent blood work, MRI, lumbar puncture, and plasma exchange during a week-and-a-half hospitalization, followed by 1.5 months of physical therapy for full recovery. The patient has experienced ongoing balance issues since the initial episode. During the GBS episode, numbness and tingling initially started in the feet, similar to the current presentation, but progressed rapidly after plasma exchange, leading to weakness in both legs and arms.  The patient denies a history of other medical problems, not on any medications. She works as a  at [employer]. She has no children, denies tobacco use, uses alcohol socially, and denies recreational drug use. There is no family history of autoimmune disease  The patient denies recent illness, recent vaccines, travel, sick contacts, and changes in medications. Denies fevers, chills, weight loss, night sweats, alopecia, sinus disease, asthma history, oral ulcers, dysphagia, vision changes/, Raynaud's, VTE, miscarriages sicca symptoms/urinary changes, edema, SOB, joint pain, swelling, jaw claudication/rash/photosensitivity/morning stiffness    MEDICATIONS  (STANDING):  enoxaparin Injectable 40 milliGRAM(s) SubCutaneous every 24 hours  influenza   Vaccine 0.5 milliLiter(s) IntraMuscular once    MEDICATIONS  (PRN):      Allergies    No Known Allergies    Intolerances        PERTINENT MEDICATION HISTORY:      Social History:  Works as a  at [employer]. Works from home. Lives alone. No children. (08 Dec 2023 17:06)      PAST MEDICAL & SURGICAL HISTORY:  GBS (Guillain-[place] syndrome)      No significant past surgical history    OCCUPATION:  TRAVEL HISTORY:    FAMILY HISTORY:      Vital Signs Last 24 Hrs  T(C): 36.9 (09 Dec 2023 17:05), Max: 36.9 (08 Dec 2023 23:58)  T(F): 98.4 (09 Dec 2023 17:05), Max: 98.5 (08 Dec 2023 23:58)  HR: 61 (09 Dec 2023 17:05) (58 - 68)  BP: 116/75 (09 Dec 2023 17:05) (115/76 - 130/85)  BP(mean): --  RR: 18 (09 Dec 2023 17:05) (18 - 18)  SpO2: 100% (09 Dec 2023 17:05) (97% - 100%)    Parameters below as of 09 Dec 2023 17:05  Patient On (Oxygen Delivery Method): room air      Physical Exam:  General: No apparent distress  HEENT: EOMI, MMM  CVS: +S1/S2, RRR, no murmurs/rubs/gallops  Resp: CTA b/l. No crackles/wheezing  GI: Soft, NT/ND +BS  MSK: no swelling/warmth/erythema of the joints of the UE/LE  Neuro: AAOx3, muscle exam normal, reduced sensation to light touch on feet, legs, fingertips,    Skin: no visible rashes    LABS:                        14.0   4.64  )-----------( 249      ( 08 Dec 2023 15:36 )             42.8     12-08    143  |  105  |  17  ----------------------------<  102<H>  3.7   |  21<L>  |  0.81    Ca    9.5      08 Dec 2023 15:36  Phos  4.1     12-08  Mg     2.2         TPro  7.6  /  Alb  4.5  /  TBili  0.4  /  DBili  x   /  AST  30  /  ALT  27  /  AlkPhos  70        Urinalysis Basic - ( 09 Dec 2023 18:20 )    Color: Yellow / Appearance: Clear / S.024 / pH: x  Gluc: x / Ketone: Negative mg/dL  / Bili: Negative / Urobili: 0.2 mg/dL   Blood: x / Protein: Negative mg/dL / Nitrite: Negative   Leuk Esterase: Moderate / RBC: x / WBC x   Sq Epi: x / Non Sq Epi: x / Bacteria: x      Rheumatology Work Up        RADIOLOGY & ADDITIONAL STUDIES:    < from: MR Lumbar Spine w/wo IV Cont (23 @ 11:28) >  Left foraminal disc herniation at the L5-S1 level.    < end of copied text >  < from: MR Thoracic Spine w/wo IV Cont (23 @ 11:27) >  IMPRESSION: No imaging evidence of Guillain [place] syndrome.    Left foraminal disc herniation at the L5-S1 level.      < end of copied text >      ?< from: MR Cervical Spine w/wo IV Cont (23 @ 11:27) >  IMPRESSION:    Abnormal enhancement along multiple bilateral cervical nerve roots with   dorsal distribution, thin/non-masslike and most consistent with a   polyneuritis.    Distribution is dorsal/sensory and fits symptoms as probable relapse of   GBS-sensory variant, versus other inflammatory condition. Correlate with   CSF sampling.    No cord compression, abnormal signal or definitive enhancement to   indicate myelitis.    < end of copied text >   ***consult has been received. note is in progress and incomplete without attending attestation***    Lise Phillip MD  PGY-4  Rheumatology Fellow  Re[employer] on TEAMS  [phone]    MICHELLE ARIAS  [id number]    HISTORY OF PRESENT ILLNESS:  43-year-old with history of GBS ( ~ 26 years ago) presented to [facility] ED on 2023, complaining of tingling in both fingertips and feet since Thursday (2023). 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  The patient has been experiencing persistent numbness and tingling in her feet and hands since 2023, coinciding with the onset of her menstrual cycle. The symptoms involve reduced sensation, pins-and-needles sensation, and a feeling similar to a foot falling asleep. The numbness has progressed from her toes to the plantar and dorsal surfaces of her feet, extending to the lower legs. Numbness has also developed in the fingertips, and the patient reports her feet feeling cold. Of particular concern to the patient is her history of Guillain-Barré Syndrome at age 17, suspected to be triggered by pregnancy, which was subsequently terminated. She underwent blood work, MRI, lumbar puncture, and plasma exchange during a week-and-a-half hospitalization, followed by 1.5 months of physical therapy for full recovery. The patient has experienced ongoing balance issues since the initial episode. During the GBS episode, numbness and tingling initially started in the feet, similar to the current presentation, but progressed rapidly after plasma exchange, leading to weakness in both legs and arms.  The patient denies a history of other medical problems, not on any medications. She works as a  at [employer]. She has no children, denies tobacco use, uses alcohol socially, and denies recreational drug use. There is no family history of autoimmune disease  The patient denies recent illness, recent vaccines, travel, sick contacts, and changes in medications. Denies fevers, chills, weight loss, night sweats, alopecia, sinus disease, asthma history, oral ulcers, dysphagia, vision changes/, Raynaud's, VTE, miscarriages sicca symptoms/urinary changes, edema, SOB, joint pain, swelling, jaw claudication/rash/photosensitivity/morning stiffness    MEDICATIONS  (STANDING):  enoxaparin Injectable 40 milliGRAM(s) SubCutaneous every 24 hours  influenza   Vaccine 0.5 milliLiter(s) IntraMuscular once    Allergies: No Known Allergies    Social History:  Works as a  at [employer]. Works from home. Lives alone. No children. (08 Dec 2023 17:06)      PAST MEDICAL & SURGICAL HISTORY:  GBS (Guillain-[place] syndrome)      No significant past surgical history      Vital Signs Last 24 Hrs  T(C): 36.9 (09 Dec 2023 17:05), Max: 36.9 (08 Dec 2023 23:58)  T(F): 98.4 (09 Dec 2023 17:05), Max: 98.5 (08 Dec 2023 23:58)  HR: 61 (09 Dec 2023 17:05) (58 - 68)  BP: 116/75 (09 Dec 2023 17:05) (115/76 - 130/85)  RR: 18 (09 Dec 2023 17:05) (18 - 18)  SpO2: 100% (09 Dec 2023 17:05) (97% - 100%)    Parameters below as of 09 Dec 2023 17:05  Patient On (Oxygen Delivery Method): room air      Physical Exam:  General: No apparent distress  HEENT: EOMI, MMM  CVS: +S1/S2, RRR, no murmurs/rubs/gallops  Resp: CTA b/l. No crackles/wheezing  GI: Soft, NT/ND +BS  MSK: no swelling/warmth/erythema of the joints of the UE/LE  Neuro: AAOx3, muscle exam normal, reduced sensation to light touch on feet, legs, fingertips,    Skin: no visible rashes    LABS:                        14.0   4.64  )-----------( 249      ( 08 Dec 2023 15:36 )             42.8     12-08    143  |  105  |  17  ----------------------------<  102<H>  3.7   |  21<L>  |  0.81    Ca    9.5      08 Dec 2023 15:36  Phos  4.1     12-08  Mg     2.2     12-08    TPro  7.6  /  Alb  4.5  /  TBili  0.4  /  DBili  x   /  AST  30  /  ALT  27  /  AlkPhos  70  12-08      Urinalysis Basic - ( 09 Dec 2023 18:20 )    Color: Yellow / Appearance: Clear / S.024 / pH: x  Gluc: x / Ketone: Negative mg/dL  / Bili: Negative / Urobili: 0.2 mg/dL   Blood: x / Protein: Negative mg/dL / Nitrite: Negative   Leuk Esterase: Moderate / RBC: x / WBC x   Sq Epi: x / Non Sq Epi: x / Bacteria: x      Rheumatology Work Up        RADIOLOGY & ADDITIONAL STUDIES:    < from: MR Lumbar Spine w/wo IV Cont (23 @ 11:28) >  Left foraminal disc herniation at the L5-S1 level.    < end of copied text >  < from: MR Thoracic Spine w/wo IV Cont (23 @ 11:27) >  IMPRESSION: No imaging evidence of Guillain [place] syndrome.    Left foraminal disc herniation at the L5-S1 level.      < end of copied text >      ?< from: MR Cervical Spine w/wo IV Cont (23 @ 11:27) >  IMPRESSION:    Abnormal enhancement along multiple bilateral cervical nerve roots with   dorsal distribution, thin/non-masslike and most consistent with a   polyneuritis.    Distribution is dorsal/sensory and fits symptoms as probable relapse of   GBS-sensory variant, versus other inflammatory condition. Correlate with   CSF sampling.    No cord compression, abnormal signal or definitive enhancement to   indicate myelitis.    < end of copied text >   ***consult has been received. note is in progress and incomplete without attending attestation***    Lise Phillip MD  PGY-4  Rheumatology Fellow  Re[employer] on TEAMS  [phone]    MICHELLE ARIAS  [id number]    HISTORY OF PRESENT ILLNESS:  43-year-old with history of GBS ( ~ 26 years ago) presented to [facility] ED on 2023, complaining of tingling in both fingertips and feet since Thursday (2023). 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  The patient has been experiencing persistent numbness and tingling in her feet and hands since 2023, coinciding with the onset of her menstrual cycle. The symptoms involve reduced sensation, pins-and-needles sensation, and a feeling similar to a foot falling asleep. The numbness has progressed from her toes to the plantar and dorsal surfaces of her feet, extending to the lower legs. Numbness has also developed in the fingertips, and the patient reports her feet feeling cold. Of particular concern to the patient is her history of Guillain-Barré Syndrome at age 17, suspected to be triggered by pregnancy, which was subsequently terminated. She underwent blood work, MRI, lumbar puncture, and plasma exchange during a week-and-a-half hospitalization, followed by 1.5 months of physical therapy for full recovery. The patient has experienced ongoing balance issues since the initial episode. During the GBS episode, numbness and tingling initially started in the feet, similar to the current presentation, but progressed rapidly after plasma exchange, leading to weakness in both legs and arms.  The patient denies a history of other medical problems, not on any medications. She works as a  at [employer]. She has no children, denies tobacco use, uses alcohol socially, and denies recreational drug use. There is no family history of autoimmune disease  The patient denies recent illness, recent vaccines, travel, sick contacts, and changes in medications. Denies fevers, chills, weight loss, night sweats, alopecia, sinus disease, asthma history, oral ulcers, dysphagia, vision changes/, Raynaud's, VTE, miscarriages sicca symptoms/urinary changes, edema, SOB, joint pain, swelling, jaw claudication/rash/photosensitivity/morning stiffness    MEDICATIONS  (STANDING):  enoxaparin Injectable 40 milliGRAM(s) SubCutaneous every 24 hours  influenza   Vaccine 0.5 milliLiter(s) IntraMuscular once    Allergies: No Known Allergies    Social History:  Works as a  at [employer]. Works from home. Lives alone. No children. (08 Dec 2023 17:06)      PAST MEDICAL & SURGICAL HISTORY:  GBS (Guillain-[place]ight[place] syndrome)      No significant past surgical history      Vital Signs Last 24 Hrs  T(C): 36.9 (09 Dec 2023 17:05), Max: 36.9 (08 Dec 2023 23:58)  T(F): 98.4 (09 Dec 2023 17:05), Max: 98.5 (08 Dec 2023 23:58)  HR: 61 (09 Dec 2023 17:05) (58 - 68)  BP: 116/75 (09 Dec 2023 17:05) (115/76 - 130/85)  RR: 18 (09 Dec 2023 17:05) (18 - 18)  SpO2: 100% (09 Dec 2023 17:05) (97% - 100%)    Parameters below as of 09 Dec 2023 17:05  Patient On (Oxygen Delivery Method): room air      Physical Exam:  General: No apparent distress  HEENT: EOMI, MMM  CVS: +S1/S2, RRR, no murmurs/rubs/gallops  Resp: CTA b/l. No crackles/wheezing  GI: Soft, NT/ND +BS  MSK: no swelling/warmth/erythema of the joints of the UE/LE  Neuro: AAOx3, muscle exam normal, reduced sensation to light touch on feet, legs, fingertips,    Skin: no visible rashes    LABS:                        14.0   4.64  )-----------( 249      ( 08 Dec 2023 15:36 )             42.8     12-08    143  |  105  |  17  ----------------------------<  102<H>  3.7   |  21<L>  |  0.81    Ca    9.5      08 Dec 2023 15:36  Phos  4.1     12-08  Mg     2.2     12-08    TPro  7.6  /  Alb  4.5  /  TBili  0.4  /  DBili  x   /  AST  30  /  ALT  27  /  AlkPhos  70  12-08      Urinalysis Basic - ( 09 Dec 2023 18:20 )    Color: Yellow / Appearance: Clear / S.024 / pH: x  Gluc: x / Ketone: Negative mg/dL  / Bili: Negative / Urobili: 0.2 mg/dL   Blood: x / Protein: Negative mg/dL / Nitrite: Negative   Leuk Esterase: Moderate / RBC: x / WBC x   Sq Epi: x / Non Sq Epi: x / Bacteria: x      Rheumatology Work Up        RADIOLOGY & ADDITIONAL STUDIES:    < from: MR Lumbar Spine w/wo IV Cont (23 @ 11:28) >  Left foraminal disc herniation at the L5-S1 level.    < end of copied text >  < from: MR Thoracic Spine w/wo IV Cont (23 @ 11:27) >  IMPRESSION: No imaging evidence of Guillain [place] syndrome.    Left foraminal disc herniation at the L5-S1 level.      < end of copied text >      ?< from: MR Cervical Spine w/wo IV Cont (23 @ 11:27) >  IMPRESSION:    Abnormal enhancement along multiple bilateral cervical nerve roots with   dorsal distribution, thin/non-masslike and most consistent with a   polyneuritis.    Distribution is dorsal/sensory and fits symptoms as probable relapse of   GBS-sensory variant, versus other inflammatory condition. Correlate with   CSF sampling.    No cord compression, abnormal signal or definitive enhancement to   indicate myelitis.    < end of copied text >

## 2023-12-09 NOTE — CONSULT NOTE ADULT - SUBJECTIVE AND OBJECTIVE BOX
Patient is a 43 year old woman admitted 12/8/23 with  a over 1 week history since last Thursday, 11/30/23 of tingling involving the feet bilaterally which slowly progressed from the toes to the ankle. In addition after 3 days of tingling of the feet she noted tingling of the fingertips which also persisted. she denies weakness. She denies other neurological complaints. She denies neck, back, arm, or leg pain. She denies trauma, heavy lifting, fever.  She was seen by an outpatient neurologist, Dr. Wong and advised to go to the ER.    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 She had progressive weakness and numbness to where unable to walk. Plasmaphoresis. Rehab. Recovered without deficits          PAP smear-abnormal, Spring 2023, HPV positive She had a LEEP procedure. She was last seen by OBGyn in July, 2023 and advised normal exam.    SH: No allergy    Exam: Awake, alert, appropriate           Pupils 2.5mm, EOM intact, no nystagmus, VFF          CN II-XII intact          Motor tone and strength  RUE   5/5    LUE  5/5                                                RLE   5/5    LLE  5/5          Reflexes 2+          Gait-normal         Sensation intact        Position sense intact                          14.0   4.64  )-----------( 249      ( 08 Dec 2023 15:36 )             42.8     12-08    143  |  105  |  17  ----------------------------<  102<H>  3.7   |  21<L>  |  0.81    Ca    9.5      08 Dec 2023 15:36  Phos  4.1     12-08  Mg     2.2     12-08    TPro  7.6  /  Alb  4.5  /  TBili  0.4  /  DBili  x   /  AST  30  /  ALT  27  /  AlkPhos  70  12-08

## 2023-12-09 NOTE — OCCUPATIONAL THERAPY INITIAL EVALUATION ADULT - PERTINENT HX OF CURRENT PROBLEM, REHAB EVAL
43y (1980) RIGHT handed woman who presented to [facility] ED on 12/8/2023, with c/o tingling in the b/l fingertips and feet since Thursday (11/30/2023). 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 Patient reports to [facility] emergency department at the behest of her outpatient neurologist, Dr. Wong. Patient reports that today was her first visit with Dr. Wong.  Patient had seen her primary care physician earlier in the week and s/he had referred her to an outpatient neurologist. This neurologist was unable to see patient until 1/2024, so she found Dr. Wong online.  Patient is seeing an outpatient neurologist because she has been having numbness and tingling in her feet and hands, ongoing since Thursday (11/30/2023) of last week.  Patient reports that she started her menses on 11/30 and was dealing with cramping.  She called off from work and was in bed all day.  She says she began feeling numbness and tingling in her feet, namely in her toes.  She describes this as reduced sensation as well as pins-and-needles.  She says the sensation is akin to your foot falling asleep and trying to wake up.  Patient says that since then she has had worsening numbness on the plantar surfaces of her feet, the dorsal surfaces of her feet, and she is beginning to feel numbness in the lower legs.  She says that the toes are the worst, followed by the plantar surfaces of her feet, then the dorsal surfaces, and then the legs.  Patient says earlier this week she began feeling numbness and tingling in the tips of her fingers. Reports that her feet feel cold. Symptoms are during the entire day - not worse at night. Patient is particularly concerned about her symptoms because she had a diagnosis of GBS at age 17.  Patient reports that the suspected trigger was pregnancy.  The pregnancy was ultimately terminated.  Patient reports that she underwent blood work, MRI, lumbar puncture.  She was admitted to the hospital for a week and a half, where she underwent plasma exchange.  She reports that she then spent 1.5 months in physical therapy to fully recover. Says she has always had balance issues since the initial episode.  She reports that her symptoms of the initial episode began similarly with numbness and tingling in her feet.  She began plasma exchange relatively quickly.  However, she reports that by the end of the plasma exchange, the numbness had spread up her legs and she was appreciably weak in both of her legs and her arms after plasma exchange.  Patient reports that there is no chance that she could be pregnant right now.  Patient denies a history of any medical problems other than a history of AIDP and abnormal Pap treated with LEEP procedure. Denies history of stroke/MI.  Reports she does not take any medications.  She denies use of blood thinners or aspirin.  She does not use any assistive devices at baseline.  She is able to climb stairs without issues.  Patient reports that she drives.  Patient does not use tobacco.  Patient drinks socially.  Patient denies use of recreational drugs.  Patient reports that she lives alone.  She has no children.  Patient works as a  at [employer]. Patient denies a family history of AIDP.  There is no history of demyelinating diseases in her family to her knowledge.  She denies family history of rheumatologic issues. Patient denies any recent illness including diarrheal illness.  She has not received any recent vaccines.  She has not had any recent travel, no sick contacts.  No changes in medications.  Patient reports that she had a LEEP procedure earlier this year for an abnormal Pap smear, otherwise she has had no new medical issues prior to this past week.  She does not regularly see a physician.   Patient denies fevers and chills. Denies headache, neck pain, chest pain, shortness of breath, abdominal pain, nausea, vomiting, diarrhea, dysuria, frequency, hematuria.  Reports normal bowel movements.  Reports normal urination. 43y (1980) RIGHT handed woman who presented to [facility] ED on 12/8/2023, with c/o tingling in the b/l fingertips and feet since Thursday (11/30/2023). 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 Patient reports to [facility] emergency department at the behest of her outpatient neurologist, Dr. Wong. Patient reports that today was her first visit with Dr. Wong.  Patient had seen her primary care physician earlier in the week and s/he had referred her to an outpatient neurologist. This neurologist was unable to see patient until 1/2024, so she found Dr. Wong online.  Patient is seeing an outpatient neurologist because she has been having numbness and tingling in her feet and hands, ongoing since Thursday (11/30/2023) of last week.  Patient reports that she started her menses on 11/30 and was dealing with cramping.  She called off from work and was in bed all day.  She says she began feeling numbness and tingling in her feet, namely in her toes.  She describes this as reduced sensation as well as pins-and-needles.  She says the sensation is akin to your foot falling asleep and trying to wake up.  Patient says that since then she has had worsening numbness on the plantar surfaces of her feet, the dorsal surfaces of her feet, and she is beginning to feel numbness in the lower legs.  She says that the toes are the worst, followed by the plantar surfaces of her feet, then the dorsal surfaces, and then the legs.  Patient says earlier this week she began feeling numbness and tingling in the tips of her fingers. Reports that her feet feel cold. Symptoms are during the entire day - not worse at night. Patient is particularly concerned about her symptoms because she had a diagnosis of GBS at age 17.  Patient reports that the suspected trigger was pregnancy.  The pregnancy was ultimately terminated.  Patient reports that she underwent blood work, MRI, lumbar puncture.  She was admitted to the hospital for a week and a half, where she underwent plasma exchange.  She reports that she then spent 1.5 months in physical therapy to fully recover. Says she has always had balance issues since the initial episode.  She reports that her symptoms of the initial episode began similarly with numbness and tingling in her feet.  She began plasma exchange relatively quickly.  However, she reports that by the end of the plasma exchange, the numbness had spread up her legs and she was appreciably weak in both of her legs and her arms after plasma exchange.  Patient reports that there is no chance that she could be pregnant right now.  Patient denies a history of any medical problems other than a history of AIDP and abnormal Pap treated with LEEP procedure. Denies history of stroke/MI.  Reports she does not take any medications.  She denies use of blood thinners or aspirin.  She does not use any assistive devices at baseline.  She is able to climb stairs without issues.  Patient reports that she drives.  Patient does not use tobacco.  Patient drinks socially.  Patient denies use of recreational drugs.  Patient reports that she lives alone.  She has no children.  Patient works as a  at [employer]. Patient denies a family history of AIDP.  There is no history of demyelinating diseases in her family to her knowledge.  She denies family history of rheumatologic issues. Patient denies any recent illness including diarrheal illness.  She has not received any recent vaccines.  She has not had any recent travel, no sick contacts.  No changes in medications.  Patient reports that she had a LEEP procedure earlier this year for an abnormal Pap smear, otherwise she has had no new medical issues prior to this past week.  She does not regularly see a physician.   Patient denies fevers and chills. Denies headache, neck pain, chest pain, shortness of breath, abdominal pain, nausea, vomiting, diarrhea, dysuria, frequency, hematuria.  Reports normal bowel movements.  Reports normal urination.

## 2023-12-09 NOTE — PHYSICAL THERAPY INITIAL EVALUATION ADULT - STAIR PATTERN, REHAB EVAL
1. It looks like Alice gyn MDs.   step over step

## 2023-12-09 NOTE — PATIENT PROFILE ADULT - MONEY FOR FOOD
Wyatt,    It was a please seeing you.  You should be able to view your labs.    Some of the labs you know about but just to make sure, your complete blood count is normal.  The cholesterol is super.  Your blood salts, kidney tests, psa, liver tests and proteins are all normal.  The sugar is just a bit high but the a1c is normal.  Please be sure to exercise and eat a healthy diet for this.    If you have any questions please call me.    Wyatt     no

## 2023-12-09 NOTE — CONSULT NOTE ADULT - ASSESSMENT
Dana Santana, a 43-year-old right-handed woman, presented at [facility] ED on 12/8/2023 with complaints of tingling in both fingertips and feet since 11/30/2023. History includes AIDP at 17, treated with plasma exchange. Concerns are heightened due to a history of Guillain-Barré Syndrome (GBS) at 17, triggered by pregnancy. GBS symptoms started similarly, progressing rapidly after plasma exchange. No other significant medical history. Denies tobacco, recreational drug use, and recent illnesses. No recent vaccines or travel. Undergoing rheumatology consult to rule pout CTDs    # Acute onset Paresthesias/numbness affecting the bilateral hands/feet - rule out AIDP  - ROS negative for rheumatic diseases  - Lab unremarkable   - MR Cervical Spine: Abnormal enhancement along multiple bilateral cervical nerve roots with dorsal distribution, thin/non-masslike and most consistent with a   polyneuritis. Distribution is dorsal/sensory and fits symptoms as probable relapse of GBS-sensory variant, versus other inflammatory condition. Correlate with CSF sampling.  - Neurology note reviewed     Recommendations   1. Agree with Neurology work up and LP   2. Please order UA    Rheumatology will follow     D/w Dr. Adela Phillip MD  PGY-4  Rheumatology Fellow  Re[employer] on TEAMS  [phone]   Dana Santana, a 43-year-old right-handed woman, presented at [facility] ED on 12/8/2023 with complaints of tingling in both fingertips and feet since 11/30/2023. History includes AIDP at 17, treated with plasma exchange. Concerns are heightened due to a history of Guillain-Barré Syndrome (GBS) at 17, triggered by pregnancy. GBS symptoms started similarly, progressing rapidly after plasma exchange. No other significant medical history. Denies tobacco, recreational drug use, and recent illnesses. No recent vaccines or travel. Undergoing rheumatology consult to rule pout CTDs    # Acute onset Paresthesias/numbness affecting the bilateral hands/feet - rule out AIDP  - ROS negative for rheumatic diseases  - Lab unremarkable   - MR Cervical Spine: Abnormal enhancement along multiple bilateral cervical nerve roots with dorsal distribution, thin/non-masslike and most consistent with a   polyneuritis. Distribution is dorsal/sensory and fits symptoms as probable relapse of GBS-sensory variant, versus other inflammatory condition. Correlate with CSF sampling.  - Neurology note reviewed     Recommendations   1. Agree with Neurology work up and LP   2. Please order UA    Rheumatology will follow     D/w Dr. Adela Phillip MD  PGY-4  Rheumatology Fellow  Re[employer] on TEAMS  [phone]

## 2023-12-09 NOTE — OCCUPATIONAL THERAPY INITIAL EVALUATION ADULT - LIVES WITH, PROFILE
Pvt home, 0 steps to enter, 1 flight with handrail to bed and bath. (I) ADLs and functional transfers without AD/DME. +/alone

## 2023-12-09 NOTE — PATIENT PROFILE ADULT - FUNCTIONAL ASSESSMENT - BASIC MOBILITY 6.
4-calculated by average/Not able to assess (calculate score using [facility] averaging method)  4-calculated by average/Not able to assess (calculate score using [facility] averaging method)

## 2023-12-10 LAB
CRP SERPL-MCNC: <3 MG/L — SIGNIFICANT CHANGE UP (ref 0–4)
CRP SERPL-MCNC: <3 MG/L — SIGNIFICANT CHANGE UP (ref 0–4)
ERYTHROCYTE [SEDIMENTATION RATE] IN BLOOD: 14 MM/HR — SIGNIFICANT CHANGE UP (ref 0–15)
ERYTHROCYTE [SEDIMENTATION RATE] IN BLOOD: 14 MM/HR — SIGNIFICANT CHANGE UP (ref 0–15)
PROT SERPL-MCNC: 6.6 G/DL — SIGNIFICANT CHANGE UP (ref 6–8.3)
PROT SERPL-MCNC: 6.6 G/DL — SIGNIFICANT CHANGE UP (ref 6–8.3)
RHEUMATOID FACT SERPL-ACNC: <10 IU/ML — SIGNIFICANT CHANGE UP (ref 0–13)
RHEUMATOID FACT SERPL-ACNC: <10 IU/ML — SIGNIFICANT CHANGE UP (ref 0–13)

## 2023-12-10 RX ORDER — CEFTRIAXONE 500 MG/1
1000 INJECTION, POWDER, FOR SOLUTION INTRAMUSCULAR; INTRAVENOUS EVERY 24 HOURS
Refills: 0 | Status: DISCONTINUED | OUTPATIENT
Start: 2023-12-10 | End: 2023-12-10

## 2023-12-10 RX ADMIN — CEFTRIAXONE 100 MILLIGRAM(S): 500 INJECTION, POWDER, FOR SOLUTION INTRAMUSCULAR; INTRAVENOUS at 05:57

## 2023-12-10 RX ADMIN — ENOXAPARIN SODIUM 40 MILLIGRAM(S): 100 INJECTION SUBCUTANEOUS at 05:06

## 2023-12-10 NOTE — PROGRESS NOTE ADULT - ASSESSMENT
Patient is a 43 year old woman admitted 12/8/23 with  a over 1 week history since last Thursday, 11/30/23 of tingling involving the feet bilaterally which slowly progressed from the toes to the ankle. In addition after 3 days of tingling of the feet she noted tingling of the fingertips which also persisted. she denies weakness. She denies other neurological complaints. She denies neck, back, arm, or leg pain. She denies trauma, heavy lifting, fever.  She was seen by an outpatient neurologist, Dr. Wong and advised to go to the ER. Neurological exam as above.    1) MRI C Spine as above abnormal enhancement multiple bilateral cervical nerve roots with dorsal distribution, thin, non-mass like and most consistent with polyneuritis, probable relapse of GBS sensory variant versus other inflammatory condition. Correlate CSF  2) MRI T Spine as above no evidence of GBS. No abnormal enhancement  3) MRI LS Spine as above no imaging evidence GBS. Left foraminal disc herniation L5-S1, no abnormal enhancement  4) ESR, C reactive protein as above less than3,,  KIESHA, RF, SPEP  5) TSH as above 1.53 WNL range.  6) ACE pending  7) Serum paraneoplastic panel pending  8) Rheumatology consult acute polyneuropathy appreciated. Will follow.  9) ObGyn consult abnormal PAP smear spring, 2023, HPV positive, S/P LEEP  10 LP

## 2023-12-10 NOTE — PROGRESS NOTE ADULT - SUBJECTIVE AND OBJECTIVE BOX
Patient is a 43 year old woman admitted 12/8/23 with  a over 1 week history since last Thursday, 11/30/23 of tingling involving the feet bilaterally which slowly progressed from the toes to the ankle. In addition after 3 days of tingling of the feet she noted tingling of the fingertips which also persisted. she denies weakness. She denies other neurological complaints. She denies neck, back, arm, or leg pain. She denies trauma, heavy lifting, fever.  She was seen by an outpatient neurologist, Dr. Wong and advised to go to the ER.    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 She had progressive weakness and numbness to where unable to walk. Plasmaphoresis. Rehab. Recovered without deficits          PAP smear-abnormal, Spring 2023, HPV positive She had a LEEP procedure. She was last seen by OBGyn in July, 2023 and advised normal exam.    SH: No allergy    Exam: Awake, alert, appropriate           Pupils 2.5mm, EOM intact, no nystagmus, VFF          CN II-XII intact          Motor tone and strength  RUE   5/5    LUE  5/5                                                RLE   5/5    LLE  5/5          Reflexes 2+          Gait-normal         Sensation intact        Position sense intact      C-Reactive Protein, Serum: <3 mg/L (12.10.23 @ 06:23)    Thyroid Stimulating Hormone, Serum: 1.53 uIU/mL (12.08.23 @ 15:36)    Pregnancy Profile, Urine: Negative: There is potential for a false-negative result for very early pregnancies  or with gestational age 5-7 weeks or above. The quantitative measurement  of serum hCG provides the most sensitive and accurate assessment of  pregnancy status. (12.09.23 @ 18:20)                              14.0   4.64  )-----------( 249      ( 08 Dec 2023 15:36 )             42.8     12-08    143  |  105  |  17  ----------------------------<  102<H>  3.7   |  21<L>  |  0.81    Ca    9.5      08 Dec 2023 15:36  Phos  4.1     12-08  Mg     2.2     12-08    TPro  7.6  /  Alb  4.5  /  TBili  0.4  /  DBili  x   /  AST  30  /  ALT  27  /  AlkPhos  70  12-0      < from: MR Cervical Spine w/wo IV Cont (12.09.23 @ 11:27) >    COMPARISON: None    FINDINGS:    Alignment: Straightened lordosis may be positional within the scanner   bore. No listhesis or subluxation. The atlantoaxial and atlantooccipital   articulations are normal.    Bones: Vertebrae are normal in height and configuration. No marrow edema   signal or pathologic enhancement.    Spinal cord: Normal in size, contour and signal. No pathologic   enhancement allowing for some heterogeneity in signal and noise/artifact   on the axial set. Sagittal imaging of the cord appears normal.    However, abnormal enhancement is visible along the dorsal nerve roots in   the cervical canal, bilaterally and symmetric along multiple roots,   sagittal series 12, image 6 for example and on multiple axial images   (series 11, images 14, 18, 23, 26 and 34 for example). Enhancement   appears thin and smooth and nonnodular most consistent with neuritis.    Spinal canal: Widely patent. No stenosis or fluid collection.    Neural foramina: No stenosis.    Soft tissues: There is no prevertebral or ligamentous edema. No mass or   fluid collection.      IMPRESSION:    Abnormal enhancement along multiple bilateral cervical nerve roots with   dorsal distribution, thin/non-masslike and most consistent with a   polyneuritis.    Distribution is dorsal/sensory and fits symptoms as probable relapse of   GBS-sensory variant, versus other inflammatory condition. Correlate with   CSF sampling.    No cord compression, abnormal signal or definitive enhancement to   indicate myelitis.      YOMAIRA HOFF MD; Attending Radiologist  This document has been electronically signed. Dec  9 2023  1:50PM        < from: MR Thoracic Spine w/wo IV Cont (12.09.23 @ 11:27) >    COMPARISON: None available.    FINDINGS: The thoracic and lumbar alignment are maintained. There is no   aggressive marrow signal change. Cord signal is normal. The conus   medullaris appears unremarkable in signal and morphology and terminates   appropriately. No abnormal intrathecal enhancement. There is no abnormal   cauda equina enhancement.    The thoracic and lumbar disc space heights are preserved. There is no   thoracolumbar spinal canal or foraminal narrowing.    At L5-S1: A bulging disc is seen with a superimposed focal disc   protrusion through an annular fissure within the left foraminal zone of   the canal. There is contact of the exiting and descendingnerve roots.    IMPRESSION: No imaging evidence of Guillain [place] syndrome.    Left foraminal disc herniation at the L5-S1 level.      SALEEM ALEXANDER MD; Attending Radiologist  This document has been electronically signed. Dec  9 2023  1:11PM        < from: MR Lumbar Spine w/wo IV Cont (12.09.23 @ 11:28) >    COMPARISON: None available.    FINDINGS: The thoracic and lumbar alignment are maintained. There is no   aggressive marrow signal change. Cord signal is normal. The conus   medullaris appears unremarkable in signal and morphology and terminates   appropriately. No abnormal intrathecal enhancement. There is no abnormal   cauda equina enhancement.    The thoracic and lumbar disc space heights are preserved. There is no   thoracolumbar spinal canal or foraminal narrowing.    At L5-S1: A bulging disc is seen with a superimposed focal disc   protrusion through an annular fissure within the left foraminal zone of   the canal. There is contact of the exiting and descendingnerve roots.    IMPRESSION: No imaging evidence of Guillain [place] syndrome.    Left foraminal disc herniation at the L5-S1 level.        SALEEM ALEXANDER MD; Attending Radiologist  This document has been electronically signed. Dec  9 2023  1:11PM             Patient is a 43 year old woman admitted 12/8/23 with  a over 1 week history since last Thursday, 11/30/23 of tingling involving the feet bilaterally which slowly progressed from the toes to the ankle. In addition after 3 days of tingling of the feet she noted tingling of the fingertips which also persisted. she denies weakness. She denies other neurological complaints. She denies neck, back, arm, or leg pain. She denies trauma, heavy lifting, fever.  She was seen by an outpatient neurologist, Dr. Wong and advised to go to the ER.    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 She had progressive weakness and numbness to where unable to walk. Plasmaphoresis. Rehab. Recovered without deficits          PAP smear-abnormal, Spring 2023, HPV positive She had a LEEP procedure. She was last seen by OBGyn in July, 2023 and advised normal exam.    SH: No allergy    Exam: Awake, alert, appropriate           Pupils 2.5mm, EOM intact, no nystagmus, VFF          CN II-XII intact          Motor tone and strength  RUE   5/5    LUE  5/5                                                RLE   5/5    LLE  5/5          Reflexes 2+          Gait-normal         Sensation intact        Position sense intact      C-Reactive Protein, Serum: <3 mg/L (12.10.23 @ 06:23)    Thyroid Stimulating Hormone, Serum: 1.53 uIU/mL (12.08.23 @ 15:36)    Pregnancy Profile, Urine: Negative: There is potential for a false-negative result for very early pregnancies  or with gestational age 5-7 weeks or above. The quantitative measurement  of serum hCG provides the most sensitive and accurate assessment of  pregnancy status. (12.09.23 @ 18:20)                              14.0   4.64  )-----------( 249      ( 08 Dec 2023 15:36 )             42.8     12-08    143  |  105  |  17  ----------------------------<  102<H>  3.7   |  21<L>  |  0.81    Ca    9.5      08 Dec 2023 15:36  Phos  4.1     12-08  Mg     2.2     12-08    TPro  7.6  /  Alb  4.5  /  TBili  0.4  /  DBili  x   /  AST  30  /  ALT  27  /  AlkPhos  70  12-0      < from: MR Cervical Spine w/wo IV Cont (12.09.23 @ 11:27) >    COMPARISON: None    FINDINGS:    Alignment: Straightened lordosis may be positional within the scanner   bore. No listhesis or subluxation. The atlantoaxial and atlantooccipital   articulations are normal.    Bones: Vertebrae are normal in height and configuration. No marrow edema   signal or pathologic enhancement.    Spinal cord: Normal in size, contour and signal. No pathologic   enhancement allowing for some heterogeneity in signal and noise/artifact   on the axial set. Sagittal imaging of the cord appears normal.    However, abnormal enhancement is visible along the dorsal nerve roots in   the cervical canal, bilaterally and symmetric along multiple roots,   sagittal series 12, image 6 for example and on multiple axial images   (series 11, images 14, 18, 23, 26 and 34 for example). Enhancement   appears thin and smooth and nonnodular most consistent with neuritis.    Spinal canal: Widely patent. No stenosis or fluid collection.    Neural foramina: No stenosis.    Soft tissues: There is no prevertebral or ligamentous edema. No mass or   fluid collection.      IMPRESSION:    Abnormal enhancement along multiple bilateral cervical nerve roots with   dorsal distribution, thin/non-masslike and most consistent with a   polyneuritis.    Distribution is dorsal/sensory and fits symptoms as probable relapse of   GBS-sensory variant, versus other inflammatory condition. Correlate with   CSF sampling.    No cord compression, abnormal signal or definitive enhancement to   indicate myelitis.      YOMAIRA HOFF MD; Attending Radiologist  This document has been electronically signed. Dec  9 2023  1:50PM        < from: MR Thoracic Spine w/wo IV Cont (12.09.23 @ 11:27) >    COMPARISON: None available.    FINDINGS: The thoracic and lumbar alignment are maintained. There is no   aggressive marrow signal change. Cord signal is normal. The conus   medullaris appears unremarkable in signal and morphology and terminates   appropriately. No abnormal intrathecal enhancement. There is no abnormal   cauda equina enhancement.    The thoracic and lumbar disc space heights are preserved. There is no   thoracolumbar spinal canal or foraminal narrowing.    At L5-S1: A bulging disc is seen with a superimposed focal disc   protrusion through an annular fissure within the left foraminal zone of   the canal. There is contact of the exiting and descendingnerve roots.    IMPRESSION: No imaging evidence of Guillain [place] syndrome.    Left foraminal disc herniation at the L5-S1 level.      SALEEM ALEXANDER MD; Attending Radiologist  This document has been electronically signed. Dec  9 2023  1:11PM        < from: MR Lumbar Spine w/wo IV Cont (12.09.23 @ 11:28) >    COMPARISON: None available.    FINDINGS: The thoracic and lumbar alignment are maintained. There is no   aggressive marrow signal change. Cord signal is normal. The conus   medullaris appears unremarkable in signal and morphology and terminates   appropriately. No abnormal intrathecal enhancement. There is no abnormal   cauda equina enhancement.    The thoracic and lumbar disc space heights are preserved. There is no   thoracolumbar spinal canal or foraminal narrowing.    At L5-S1: A bulging disc is seen with a superimposed focal disc   protrusion through an annular fissure within the left foraminal zone of   the canal. There is contact of the exiting and descendingnerve roots.    IMPRESSION: No imaging evidence of Guillain [place] syndrome.    Left foraminal disc herniation at the L5-S1 level.        SALEEM ALEXANDER MD; Attending Radiologist  This document has been electronically signed. Dec  9 2023  1:11PM             Patient is a 43 year old woman admitted 12/8/23 with  a over 1 week history since last Thursday, 11/30/23 of tingling involving the feet bilaterally which slowly progressed from the toes to the ankle. In addition after 3 days of tingling of the feet she noted tingling of the fingertips which also persisted. she denies weakness. She denies other neurological complaints. She denies neck, back, arm, or leg pain. She denies trauma, heavy lifting, fever.  She was seen by an outpatient neurologist, Dr. Wong and advised to go to the ER. Today, Sunday she states no change with tingling of the left and right foot to the ankle and tingling of the fingertips right and left.    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 She had progressive weakness and numbness to where unable to walk. Plasmaphoresis. Rehab. Recovered without deficits          PAP smear-abnormal, Spring 2023, HPV positive She had a LEEP procedure. She was last seen by OBGyn in July, 2023 and advised normal exam.    SH: No allergy    Exam: Awake, alert, appropriate           Pupils 2.5mm, EOM intact, no nystagmus, VFF          CN II-XII intact          Motor tone and strength  RUE   5/5    LUE  5/5                                                RLE   5/5    LLE  5/5          Reflexes 2+          Gait-normal         Sensation intact        Position sense intact      C-Reactive Protein, Serum: <3 mg/L (12.10.23 @ 06:23)    Thyroid Stimulating Hormone, Serum: 1.53 uIU/mL (12.08.23 @ 15:36)    Pregnancy Profile, Urine: Negative: There is potential for a false-negative result for very early pregnancies  or with gestational age 5-7 weeks or above. The quantitative measurement  of serum hCG provides the most sensitive and accurate assessment of  pregnancy status. (12.09.23 @ 18:20)                              14.0   4.64  )-----------( 249      ( 08 Dec 2023 15:36 )             42.8     12-08    143  |  105  |  17  ----------------------------<  102<H>  3.7   |  21<L>  |  0.81    Ca    9.5      08 Dec 2023 15:36  Phos  4.1     12-08  Mg     2.2     12-08    TPro  7.6  /  Alb  4.5  /  TBili  0.4  /  DBili  x   /  AST  30  /  ALT  27  /  AlkPhos  70  12-0      < from: MR Cervical Spine w/wo IV Cont (12.09.23 @ 11:27) >    COMPARISON: None    FINDINGS:    Alignment: Straightened lordosis may be positional within the scanner   bore. No listhesis or subluxation. The atlantoaxial and atlantooccipital   articulations are normal.    Bones: Vertebrae are normal in height and configuration. No marrow edema   signal or pathologic enhancement.    Spinal cord: Normal in size, contour and signal. No pathologic   enhancement allowing for some heterogeneity in signal and noise/artifact   on the axial set. Sagittal imaging of the cord appears normal.    However, abnormal enhancement is visible along the dorsal nerve roots in   the cervical canal, bilaterally and symmetric along multiple roots,   sagittal series 12, image 6 for example and on multiple axial images   (series 11, images 14, 18, 23, 26 and 34 for example). Enhancement   appears thin and smooth and nonnodular most consistent with neuritis.    Spinal canal: Widely patent. No stenosis or fluid collection.    Neural foramina: No stenosis.    Soft tissues: There is no prevertebral or ligamentous edema. No mass or   fluid collection.      IMPRESSION:    Abnormal enhancement along multiple bilateral cervical nerve roots with   dorsal distribution, thin/non-masslike and most consistent with a   polyneuritis.    Distribution is dorsal/sensory and fits symptoms as probable relapse of   GBS-sensory variant, versus other inflammatory condition. Correlate with   CSF sampling.    No cord compression, abnormal signal or definitive enhancement to   indicate myelitis.      YOMAIRA HOFF MD; Attending Radiologist  This document has been electronically signed. Dec  9 2023  1:50PM        < from: MR Thoracic Spine w/wo IV Cont (12.09.23 @ 11:27) >    COMPARISON: None available.    FINDINGS: The thoracic and lumbar alignment are maintained. There is no   aggressive marrow signal change. Cord signal is normal. The conus   medullaris appears unremarkable in signal and morphology and terminates   appropriately. No abnormal intrathecal enhancement. There is no abnormal   cauda equina enhancement.    The thoracic and lumbar disc space heights are preserved. There is no   thoracolumbar spinal canal or foraminal narrowing.    At L5-S1: A bulging disc is seen with a superimposed focal disc   protrusion through an annular fissure within the left foraminal zone of   the canal. There is contact of the exiting and descendingnerve roots.    IMPRESSION: No imaging evidence of Guillain [place] syndrome.    Left foraminal disc herniation at the L5-S1 level.      SALEEM ALEXANDER MD; Attending Radiologist  This document has been electronically signed. Dec  9 2023  1:11PM        < from: MR Lumbar Spine w/wo IV Cont (12.09.23 @ 11:28) >    COMPARISON: None available.    FINDINGS: The thoracic and lumbar alignment are maintained. There is no   aggressive marrow signal change. Cord signal is normal. The conus   medullaris appears unremarkable in signal and morphology and terminates   appropriately. No abnormal intrathecal enhancement. There is no abnormal   cauda equina enhancement.    The thoracic and lumbar disc space heights are preserved. There is no   thoracolumbar spinal canal or foraminal narrowing.    At L5-S1: A bulging disc is seen with a superimposed focal disc   protrusion through an annular fissure within the left foraminal zone of   the canal. There is contact of the exiting and descendingnerve roots.    IMPRESSION: No imaging evidence of Guillain [place] syndrome.    Left foraminal disc herniation at the L5-S1 level.        SALEEM ALEXANDER MD; Attending Radiologist  This document has been electronically signed. Dec  9 2023  1:11PM             Patient is a 43 year old woman admitted 12/8/23 with  a over 1 week history since last Thursday, 11/30/23 of tingling involving the feet bilaterally which slowly progressed from the toes to the ankle. In addition after 3 days of tingling of the feet she noted tingling of the fingertips which also persisted. she denies weakness. She denies other neurological complaints. She denies neck, back, arm, or leg pain. She denies trauma, heavy lifting, fever.  She was seen by an outpatient neurologist, Dr. Wong and advised to go to the ER. Today, Sunday she states no change with tingling of the left and right foot to the ankle and tingling of the fingertips right and left.    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 She had progressive weakness and numbness to where unable to walk. Plasmaphoresis. Rehab. Recovered without deficits          PAP smear-abnormal, Spring 2023, HPV positive She had a LEEP procedure. She was last seen by OBGyn in July, 2023 and advised normal exam.    SH: No allergy    Exam: Awake, alert, appropriate           Pupils 2.5mm, EOM intact, no nystagmus, VFF          CN II-XII intact          Motor tone and strength  RUE   5/5    LUE  5/5                                                RLE   5/5    LLE  5/5          Reflexes 2+          Gait-normal         Sensation intact        Position sense intact      C-Reactive Protein, Serum: <3 mg/L (12.10.23 @ 06:23)    Thyroid Stimulating Hormone, Serum: 1.53 uIU/mL (12.08.23 @ 15:36)    Pregnancy Profile, Urine: Negative: There is potential for a false-negative result for very early pregnancies  or with gestational age 5-7 weeks or above. The quantitative measurement  of serum hCG provides the most sensitive and accurate assessment of  pregnancy status. (12.09.23 @ 18:20)                              14.0   4.64  )-----------( 249      ( 08 Dec 2023 15:36 )             42.8     12-08    143  |  105  |  17  ----------------------------<  102<H>  3.7   |  21<L>  |  0.81    Ca    9.5      08 Dec 2023 15:36  Phos  4.1     12-08  Mg     2.2     12-08    TPro  7.6  /  Alb  4.5  /  TBili  0.4  /  DBili  x   /  AST  30  /  ALT  27  /  AlkPhos  70  12-0      < from: MR Cervical Spine w/wo IV Cont (12.09.23 @ 11:27) >    COMPARISON: None    FINDINGS:    Alignment: Straightened lordosis may be positional within the scanner   bore. No listhesis or subluxation. The atlantoaxial and atlantooccipital   articulations are normal.    Bones: Vertebrae are normal in height and configuration. No marrow edema   signal or pathologic enhancement.    Spinal cord: Normal in size, contour and signal. No pathologic   enhancement allowing for some heterogeneity in signal and noise/artifact   on the axial set. Sagittal imaging of the cord appears normal.    However, abnormal enhancement is visible along the dorsal nerve roots in   the cervical canal, bilaterally and symmetric along multiple roots,   sagittal series 12, image 6 for example and on multiple axial images   (series 11, images 14, 18, 23, 26 and 34 for example). Enhancement   appears thin and smooth and nonnodular most consistent with neuritis.    Spinal canal: Widely patent. No stenosis or fluid collection.    Neural foramina: No stenosis.    Soft tissues: There is no prevertebral or ligamentous edema. No mass or   fluid collection.      IMPRESSION:    Abnormal enhancement along multiple bilateral cervical nerve roots with   dorsal distribution, thin/non-masslike and most consistent with a   polyneuritis.    Distribution is dorsal/sensory and fits symptoms as probable relapse of   GBS-sensory variant, versus other inflammatory condition. Correlate with   CSF sampling.    No cord compression, abnormal signal or definitive enhancement to   indicate myelitis.      YOMAIRA HOFF MD; Attending Radiologist  This document has been electronically signed. Dec  9 2023  1:50PM        < from: MR Thoracic Spine w/wo IV Cont (12.09.23 @ 11:27) >    COMPARISON: None available.    FINDINGS: The thoracic and lumbar alignment are maintained. There is no   aggressive marrow signal change. Cord signal is normal. The conus   medullaris appears unremarkable in signal and morphology and terminates   appropriately. No abnormal intrathecal enhancement. There is no abnormal   cauda equina enhancement.    The thoracic and lumbar disc space heights are preserved. There is no   thoracolumbar spinal canal or foraminal narrowing.    At L5-S1: A bulging disc is seen with a superimposed focal disc   protrusion through an annular fissure within the left foraminal zone of   the canal. There is contact of the exiting and descendingnerve roots.    IMPRESSION: No imaging evidence of Guillain [place] syndrome.    Left foraminal disc herniation at the L5-S1 level.      SALEEM ALEXANDER MD; Attending Radiologist  This document has been electronically signed. Dec  9 2023  1:11PM        < from: MR Lumbar Spine w/wo IV Cont (12.09.23 @ 11:28) >    COMPARISON: None available.    FINDINGS: The thoracic and lumbar alignment are maintained. There is no   aggressive marrow signal change. Cord signal is normal. The conus   medullaris appears unremarkable in signal and morphology and terminates   appropriately. No abnormal intrathecal enhancement. There is no abnormal   cauda equina enhancement.    The thoracic and lumbar disc space heights are preserved. There is no   thoracolumbar spinal canal or foraminal narrowing.    At L5-S1: A bulging disc is seen with a superimposed focal disc   protrusion through an annular fissure within the left foraminal zone of   the canal. There is contact of the exiting and descendingnerve roots.    IMPRESSION: No imaging evidence of Guillain [place] syndrome.    Left foraminal disc herniation at the L5-S1 level.        SALEEM ALEXANDER MD; Attending Radiologist  This document has been electronically signed. Dec  9 2023  1:11PM

## 2023-12-11 LAB
% ALBUMIN: 54.2 % — SIGNIFICANT CHANGE UP
% ALBUMIN: 54.2 % — SIGNIFICANT CHANGE UP
% ALPHA 1: 3.8 % — SIGNIFICANT CHANGE UP
% ALPHA 1: 3.8 % — SIGNIFICANT CHANGE UP
% ALPHA 2: 10.9 % — SIGNIFICANT CHANGE UP
% ALPHA 2: 10.9 % — SIGNIFICANT CHANGE UP
% BETA: 13.2 % — SIGNIFICANT CHANGE UP
% BETA: 13.2 % — SIGNIFICANT CHANGE UP
% GAMMA: 17.9 % — SIGNIFICANT CHANGE UP
% GAMMA: 17.9 % — SIGNIFICANT CHANGE UP
ALBUMIN SERPL ELPH-MCNC: 3.6 G/DL — SIGNIFICANT CHANGE UP (ref 3.6–5.5)
ALBUMIN SERPL ELPH-MCNC: 3.6 G/DL — SIGNIFICANT CHANGE UP (ref 3.6–5.5)
ALBUMIN/GLOB SERPL ELPH: 1.2 RATIO — SIGNIFICANT CHANGE UP
ALBUMIN/GLOB SERPL ELPH: 1.2 RATIO — SIGNIFICANT CHANGE UP
ALPHA1 GLOB SERPL ELPH-MCNC: 0.3 G/DL — SIGNIFICANT CHANGE UP (ref 0.1–0.4)
ALPHA1 GLOB SERPL ELPH-MCNC: 0.3 G/DL — SIGNIFICANT CHANGE UP (ref 0.1–0.4)
ALPHA2 GLOB SERPL ELPH-MCNC: 0.7 G/DL — SIGNIFICANT CHANGE UP (ref 0.5–1)
ALPHA2 GLOB SERPL ELPH-MCNC: 0.7 G/DL — SIGNIFICANT CHANGE UP (ref 0.5–1)
ANION GAP SERPL CALC-SCNC: 12 MMOL/L — SIGNIFICANT CHANGE UP (ref 5–17)
ANION GAP SERPL CALC-SCNC: 12 MMOL/L — SIGNIFICANT CHANGE UP (ref 5–17)
APPEARANCE CSF: CLEAR — SIGNIFICANT CHANGE UP
APPEARANCE CSF: CLEAR — SIGNIFICANT CHANGE UP
B-GLOBULIN SERPL ELPH-MCNC: 0.9 G/DL — SIGNIFICANT CHANGE UP (ref 0.5–1)
B-GLOBULIN SERPL ELPH-MCNC: 0.9 G/DL — SIGNIFICANT CHANGE UP (ref 0.5–1)
BUN SERPL-MCNC: 18 MG/DL — SIGNIFICANT CHANGE UP (ref 7–23)
BUN SERPL-MCNC: 18 MG/DL — SIGNIFICANT CHANGE UP (ref 7–23)
CALCIUM SERPL-MCNC: 9.6 MG/DL — SIGNIFICANT CHANGE UP (ref 8.4–10.5)
CALCIUM SERPL-MCNC: 9.6 MG/DL — SIGNIFICANT CHANGE UP (ref 8.4–10.5)
CHLORIDE SERPL-SCNC: 105 MMOL/L — SIGNIFICANT CHANGE UP (ref 96–108)
CHLORIDE SERPL-SCNC: 105 MMOL/L — SIGNIFICANT CHANGE UP (ref 96–108)
CO2 SERPL-SCNC: 23 MMOL/L — SIGNIFICANT CHANGE UP (ref 22–31)
CO2 SERPL-SCNC: 23 MMOL/L — SIGNIFICANT CHANGE UP (ref 22–31)
COLOR CSF: SIGNIFICANT CHANGE UP
COLOR CSF: SIGNIFICANT CHANGE UP
CREAT SERPL-MCNC: 0.86 MG/DL — SIGNIFICANT CHANGE UP (ref 0.5–1.3)
CREAT SERPL-MCNC: 0.86 MG/DL — SIGNIFICANT CHANGE UP (ref 0.5–1.3)
CULTURE RESULTS: SIGNIFICANT CHANGE UP
CULTURE RESULTS: SIGNIFICANT CHANGE UP
EGFR: 85 ML/MIN/1.73M2 — SIGNIFICANT CHANGE UP
EGFR: 85 ML/MIN/1.73M2 — SIGNIFICANT CHANGE UP
GAMMA GLOBULIN: 1.2 G/DL — SIGNIFICANT CHANGE UP (ref 0.6–1.6)
GAMMA GLOBULIN: 1.2 G/DL — SIGNIFICANT CHANGE UP (ref 0.6–1.6)
GLUCOSE CSF-MCNC: 59 MG/DL — SIGNIFICANT CHANGE UP (ref 40–70)
GLUCOSE CSF-MCNC: 59 MG/DL — SIGNIFICANT CHANGE UP (ref 40–70)
GLUCOSE SERPL-MCNC: 91 MG/DL — SIGNIFICANT CHANGE UP (ref 70–99)
GLUCOSE SERPL-MCNC: 91 MG/DL — SIGNIFICANT CHANGE UP (ref 70–99)
GRAM STN FLD: SIGNIFICANT CHANGE UP
GRAM STN FLD: SIGNIFICANT CHANGE UP
HCT VFR BLD CALC: 40 % — SIGNIFICANT CHANGE UP (ref 34.5–45)
HCT VFR BLD CALC: 40 % — SIGNIFICANT CHANGE UP (ref 34.5–45)
HGB BLD-MCNC: 13.1 G/DL — SIGNIFICANT CHANGE UP (ref 11.5–15.5)
HGB BLD-MCNC: 13.1 G/DL — SIGNIFICANT CHANGE UP (ref 11.5–15.5)
INTERPRETATION SERPL IFE-IMP: SIGNIFICANT CHANGE UP
INTERPRETATION SERPL IFE-IMP: SIGNIFICANT CHANGE UP
LYMPHOCYTES # CSF: 52 % — SIGNIFICANT CHANGE UP (ref 40–80)
LYMPHOCYTES # CSF: 52 % — SIGNIFICANT CHANGE UP (ref 40–80)
MCHC RBC-ENTMCNC: 29.3 PG — SIGNIFICANT CHANGE UP (ref 27–34)
MCHC RBC-ENTMCNC: 29.3 PG — SIGNIFICANT CHANGE UP (ref 27–34)
MCHC RBC-ENTMCNC: 32.8 GM/DL — SIGNIFICANT CHANGE UP (ref 32–36)
MCHC RBC-ENTMCNC: 32.8 GM/DL — SIGNIFICANT CHANGE UP (ref 32–36)
MCV RBC AUTO: 89.5 FL — SIGNIFICANT CHANGE UP (ref 80–100)
MCV RBC AUTO: 89.5 FL — SIGNIFICANT CHANGE UP (ref 80–100)
MONOS+MACROS NFR CSF: 48 % — HIGH (ref 15–45)
MONOS+MACROS NFR CSF: 48 % — HIGH (ref 15–45)
NEUTROPHILS # CSF: SIGNIFICANT CHANGE UP (ref 0–6)
NEUTROPHILS # CSF: SIGNIFICANT CHANGE UP (ref 0–6)
NRBC # BLD: 0 /100 WBCS — SIGNIFICANT CHANGE UP (ref 0–0)
NRBC # BLD: 0 /100 WBCS — SIGNIFICANT CHANGE UP (ref 0–0)
NRBC NFR CSF: <1 /UL — SIGNIFICANT CHANGE UP (ref 0–5)
NRBC NFR CSF: <1 /UL — SIGNIFICANT CHANGE UP (ref 0–5)
PLATELET # BLD AUTO: 229 K/UL — SIGNIFICANT CHANGE UP (ref 150–400)
PLATELET # BLD AUTO: 229 K/UL — SIGNIFICANT CHANGE UP (ref 150–400)
POTASSIUM SERPL-MCNC: 4.5 MMOL/L — SIGNIFICANT CHANGE UP (ref 3.5–5.3)
POTASSIUM SERPL-MCNC: 4.5 MMOL/L — SIGNIFICANT CHANGE UP (ref 3.5–5.3)
POTASSIUM SERPL-SCNC: 4.5 MMOL/L — SIGNIFICANT CHANGE UP (ref 3.5–5.3)
POTASSIUM SERPL-SCNC: 4.5 MMOL/L — SIGNIFICANT CHANGE UP (ref 3.5–5.3)
PROT CSF-MCNC: 56 MG/DL — HIGH (ref 15–45)
PROT CSF-MCNC: 56 MG/DL — HIGH (ref 15–45)
PROT PATTERN SERPL ELPH-IMP: SIGNIFICANT CHANGE UP
PROT PATTERN SERPL ELPH-IMP: SIGNIFICANT CHANGE UP
PROT SERPL-MCNC: 6.6 G/DL — SIGNIFICANT CHANGE UP (ref 6–8.3)
PROT SERPL-MCNC: 6.6 G/DL — SIGNIFICANT CHANGE UP (ref 6–8.3)
RBC # BLD: 4.47 M/UL — SIGNIFICANT CHANGE UP (ref 3.8–5.2)
RBC # BLD: 4.47 M/UL — SIGNIFICANT CHANGE UP (ref 3.8–5.2)
RBC # CSF: 0 /UL — SIGNIFICANT CHANGE UP (ref 0–0)
RBC # CSF: 0 /UL — SIGNIFICANT CHANGE UP (ref 0–0)
RBC # FLD: 13 % — SIGNIFICANT CHANGE UP (ref 10.3–14.5)
RBC # FLD: 13 % — SIGNIFICANT CHANGE UP (ref 10.3–14.5)
SODIUM SERPL-SCNC: 140 MMOL/L — SIGNIFICANT CHANGE UP (ref 135–145)
SODIUM SERPL-SCNC: 140 MMOL/L — SIGNIFICANT CHANGE UP (ref 135–145)
SPECIMEN SOURCE: SIGNIFICANT CHANGE UP
TUBE TYPE: SIGNIFICANT CHANGE UP
TUBE TYPE: SIGNIFICANT CHANGE UP
WBC # BLD: 4.17 K/UL — SIGNIFICANT CHANGE UP (ref 3.8–10.5)
WBC # BLD: 4.17 K/UL — SIGNIFICANT CHANGE UP (ref 3.8–10.5)
WBC # FLD AUTO: 4.17 K/UL — SIGNIFICANT CHANGE UP (ref 3.8–10.5)
WBC # FLD AUTO: 4.17 K/UL — SIGNIFICANT CHANGE UP (ref 3.8–10.5)

## 2023-12-11 PROCEDURE — 99232 SBSQ HOSP IP/OBS MODERATE 35: CPT

## 2023-12-11 RX ORDER — LIDOCAINE HCL 20 MG/ML
10 VIAL (ML) INJECTION ONCE
Refills: 0 | Status: DISCONTINUED | OUTPATIENT
Start: 2023-12-11 | End: 2023-12-15

## 2023-12-11 RX ADMIN — ENOXAPARIN SODIUM 40 MILLIGRAM(S): 100 INJECTION SUBCUTANEOUS at 05:09

## 2023-12-11 NOTE — PROGRESS NOTE ADULT - SUBJECTIVE AND OBJECTIVE BOX
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)      No significant past surgical history        FAMILY HISTORY:      MEDICATIONS (HOME):  Home Medications:    MEDICATIONS  (STANDING):  enoxaparin Injectable 40 milliGRAM(s) SubCutaneous every 24 hours  influenza   Vaccine 0.5 milliLiter(s) IntraMuscular once    MEDICATIONS  (PRN):    ALLERGIES/INTOLERANCES:  Allergies  No Known Allergies    Intolerances    VITALS & EXAMINATION:  Vital Signs Last 24 Hrs  T(C): 36.8 (11 Dec 2023 09:28), Max: 37.1 (10 Dec 2023 12:15)  T(F): 98.3 (11 Dec 2023 09:28), Max: 98.7 (10 Dec 2023 12:15)  HR: 71 (11 Dec 2023 09:28) (52 - 75)  BP: 123/81 (11 Dec 2023 09:28) (100/63 - 124/84)  BP(mean): --  RR: 18 (11 Dec 2023 09:28) (18 - 18)  SpO2: 97% (11 Dec 2023 09:28) (95% - 99%)    Parameters below as of 11 Dec 2023 04:12  Patient On (Oxygen Delivery Method): room air        General:  Constitutional: Obese Female, appears stated age, in no apparent distress including pain  Head: Normocephalic & atraumatic.  ENT: Patent ear canals, intact TM, mucus membranes moist & pink, neck supple, no lymphadenopathy.   Respiratory: Patent airway. All lung fields are clear to auscultation bilaterally.  Extremities: No cyanosis, clubbing, or edema.  Skin: No rashes, bruising, or discoloration.    Cardiovascular (>2): RRR no murmurs. Carotid pulsations symmetric, no bruits. Normal capillary beds refill, 1-2 seconds or less.     Neurological (>12):  MS: Awake, alert, oriented to person, place, situation, time. Normal affect. Follows all commands.    Language: Speech is clear, fluent with good repetition & comprehension (able to name objects___)    CNs: PERRLA (R = 3mm, L = 3mm). VFF. EOMI no nystagmus, no diplopia. V1-3 intact to LT/pinprick, well developed masseter muscles b/l. No facial asymmetry b/l, full eye closure strength b/l. Hearing grossly normal (rubbing fingers) b/l. Symmetric palate elevation in midline. Gag reflex deferred. Head turning & shoulder shrug intact b/l. Tongue midline, normal movements, no atrophy.    Fundoscopic: pale w/ sharp discs margins No vascular changes.      Motor: Normal muscle bulk & tone. No noticeable tremor or seizure. No pronator drift.              Deltoid	Biceps	Triceps	Wrist	Finger ABd	   R	5	5	5	5	5		5 	  L	5	5	5	5	5		5    	H-Flex	H-Ext	H-ABd	H-ADd	K-Flex	K-Ext	D-Flex	P-Flex  R	5	5	5	5	5	5	5	5 	   L	5	5	5	5	5	5	5	5	     Sensation: Intact to LT/PP/Temp/Vibration/Position b/l throughout.     Cortical: Extinction on DSS (neglect): none    Reflexes:              Biceps(C5)       BR(C6)     Triceps(C7)               Patellar(L4)    Achilles(S1)    Plantar Resp  R	2	          2	             2		        2		    2		Down   L	2	          2	             2		        2		    2		Down     Coordination: intact rapid-alt movements. No dysmetria to FTN/HTS    Gait: Normal Romberg. No postural instability. Normal stance and tandem gait.     LABORATORY:  CBC                       13.1   4.17  )-----------( 229      ( 11 Dec 2023 06:26 )             40.0     Chem 12-11    140  |  105  |  18  ----------------------------<  91  4.5   |  23  |  0.86    Ca    9.6      11 Dec 2023 06:26    TPro  6.6  /  Alb  x   /  TBili  x   /  DBili  x   /  AST  x   /  ALT  x   /  AlkPhos  x   12-10    LFTs LIVER FUNCTIONS - ( 10 Dec 2023 06:23 )  Alb: x     / Pro: 6.6 g/dL / ALK PHOS: x     / ALT: x     / AST: x     / GGT: x           U/A Urinalysis Basic - ( 11 Dec 2023 06:26 )    Color: x / Appearance: x / SG: x / pH: x  Gluc: 91 mg/dL / Ketone: x  / Bili: x / Urobili: x   Blood: x / Protein: x / Nitrite: x   Leuk Esterase: x / RBC: x / WBC x   Sq Epi: x / Non Sq Epi: x / Bacteria: x        STUDIES & IMAGING:  Studies (EKG, EEG, EMG, etc):     Radiology (XR, CT, MR, U/S, TTE/NEERU): SUBJECTIVE/INTERVAL HISTORY:    PAST MEDICAL & SURGICAL HISTORY:  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)      No significant past surgical history        FAMILY HISTORY:      MEDICATIONS (HOME):  Home Medications:    MEDICATIONS  (STANDING):  enoxaparin Injectable 40 milliGRAM(s) SubCutaneous every 24 hours  influenza   Vaccine 0.5 milliLiter(s) IntraMuscular once    MEDICATIONS  (PRN):    ALLERGIES/INTOLERANCES:  Allergies  No Known Allergies    Intolerances    VITALS & EXAMINATION:  Vital Signs Last 24 Hrs  T(C): 36.8 (11 Dec 2023 09:28), Max: 37.1 (10 Dec 2023 12:15)  T(F): 98.3 (11 Dec 2023 09:28), Max: 98.7 (10 Dec 2023 12:15)  HR: 71 (11 Dec 2023 09:28) (52 - 75)  BP: 123/81 (11 Dec 2023 09:28) (100/63 - 124/84)  BP(mean): --  RR: 18 (11 Dec 2023 09:28) (18 - 18)  SpO2: 97% (11 Dec 2023 09:28) (95% - 99%)    Parameters below as of 11 Dec 2023 04:12  Patient On (Oxygen Delivery Method): room air        General:  Constitutional: Obese Female, appears stated age, in no apparent distress including pain  Head: Normocephalic & atraumatic.  ENT: Patent ear canals, intact TM, mucus membranes moist & pink, neck supple, no lymphadenopathy.   Respiratory: Patent airway. All lung fields are clear to auscultation bilaterally.  Extremities: No cyanosis, clubbing, or edema.  Skin: No rashes, bruising, or discoloration.    Cardiovascular (>2): RRR no murmurs. Carotid pulsations symmetric, no bruits. Normal capillary beds refill, 1-2 seconds or less.     Neurological (>12):  MS: Awake, alert, oriented to person, place, situation, time. Normal affect. Follows all commands.    Language: Speech is clear, fluent with good repetition & comprehension (able to name objects___)    CNs: PERRLA (R = 3mm, L = 3mm). VFF. EOMI no nystagmus, no diplopia. V1-3 intact to LT/pinprick, well developed masseter muscles b/l. No facial asymmetry b/l, full eye closure strength b/l. Hearing grossly normal (rubbing fingers) b/l. Symmetric palate elevation in midline. Gag reflex deferred. Head turning & shoulder shrug intact b/l. Tongue midline, normal movements, no atrophy.    Fundoscopic: pale w/ sharp discs margins No vascular changes.      Motor: Normal muscle bulk & tone. No noticeable tremor or seizure. No pronator drift.              Deltoid	Biceps	Triceps	Wrist	Finger ABd	   R	5	5	5	5	5		5 	  L	5	5	5	5	5		5    	H-Flex	H-Ext	H-ABd	H-ADd	K-Flex	K-Ext	D-Flex	P-Flex  R	5	5	5	5	5	5	5	5 	   L	5	5	5	5	5	5	5	5	     Sensation: Intact to LT/PP/Temp/Vibration/Position b/l throughout.     Cortical: Extinction on DSS (neglect): none    Reflexes:              Biceps(C5)       BR(C6)     Triceps(C7)               Patellar(L4)    Achilles(S1)    Plantar Resp  R	2	          2	             2		        2		    2		Down   L	2	          2	             2		        2		    2		Down     Coordination: intact rapid-alt movements. No dysmetria to FTN/HTS    Gait: Normal Romberg. No postural instability. Normal stance and tandem gait.     LABORATORY:  CBC                       13.1   4.17  )-----------( 229      ( 11 Dec 2023 06:26 )             40.0     Chem 12-11    140  |  105  |  18  ----------------------------<  91  4.5   |  23  |  0.86    Ca    9.6      11 Dec 2023 06:26    TPro  6.6  /  Alb  x   /  TBili  x   /  DBili  x   /  AST  x   /  ALT  x   /  AlkPhos  x   12-10    LFTs LIVER FUNCTIONS - ( 10 Dec 2023 06:23 )  Alb: x     / Pro: 6.6 g/dL / ALK PHOS: x     / ALT: x     / AST: x     / GGT: x           U/A Urinalysis Basic - ( 11 Dec 2023 06:26 )    Color: x / Appearance: x / SG: x / pH: x  Gluc: 91 mg/dL / Ketone: x  / Bili: x / Urobili: x   Blood: x / Protein: x / Nitrite: x   Leuk Esterase: x / RBC: x / WBC x   Sq Epi: x / Non Sq Epi: x / Bacteria: x        STUDIES & IMAGING:  Studies (EKG, EEG, EMG, etc):     Radiology (XR, CT, MR, U/S, TTE/NEERU): SUBJECTIVE/INTERVAL HISTORY: Patient is a 44 y/o female who presented to the [facility] ED on 12/8/23 due to complaint of numbness and tingling in her feet and fingertips bilaterally since 11/30. The patients symptoms started in her toes then progressed to her dorsal and ventral feet the following day. About 3 days later, the sensation began in her fingertips. She denies any difficulties with ambulating, strength, or fine finger movements. She denies pain but says the affected areas feel "sensitive". Since her admission, the patient does not admit to any progression of her symptoms. There is no known inciting factor at this time, denies recent immunizations, illnesses, travel. 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 The patient was pregnant at this time. Patient was treated with plasma exchange and PT and she believes she has recovered to 90% of normal since then. Lumbar puncture was discussed with the patient and she is agreeable.     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)      No significant past surgical history        FAMILY HISTORY:      MEDICATIONS (HOME):  Home Medications:    MEDICATIONS  (STANDING):  enoxaparin Injectable 40 milliGRAM(s) SubCutaneous every 24 hours  influenza   Vaccine 0.5 milliLiter(s) IntraMuscular once    MEDICATIONS  (PRN):    ALLERGIES/INTOLERANCES:  Allergies  No Known Allergies    Intolerances    VITALS & EXAMINATION:  Vital Signs Last 24 Hrs  T(C): 36.8 (11 Dec 2023 09:28), Max: 37.1 (10 Dec 2023 12:15)  T(F): 98.3 (11 Dec 2023 09:28), Max: 98.7 (10 Dec 2023 12:15)  HR: 71 (11 Dec 2023 09:28) (52 - 75)  BP: 123/81 (11 Dec 2023 09:28) (100/63 - 124/84)  BP(mean): --  RR: 18 (11 Dec 2023 09:28) (18 - 18)  SpO2: 97% (11 Dec 2023 09:28) (95% - 99%)    Parameters below as of 11 Dec 2023 04:12  Patient On (Oxygen Delivery Method): room air        General:  Constitutional: Overweight Female, appears stated age, in no apparent distress including pain  Head: Normocephalic & atraumatic.  ENT: Neck supple, no lymphadenopathy.   Respiratory: Patent airway.   Extremities: No cyanosis, clubbing, or edema.  Skin: No rashes, bruising, or discoloration.    Cardiovascular (>2): Palpable pulses. Normal capillary beds refill, 1-2 seconds or less.     Neurological (>12):  MS: Awake, alert, oriented to person, place, situation, time. Normal affect. Follows all commands.    Language: Speech is clear, fluent with good repetition & comprehension. Able to name objects: pen, phone, thumb.    CNs: PERRLA (R = 4mm, L = 4mm). VFF. EOMI no nystagmus, no diplopia. V1-3 intact to LT, well developed masseter muscles b/l. No facial asymmetry b/l, full eye closure strength b/l. Hearing grossly normal (rubbing fingers) b/l. Symmetric palate elevation in midline. Gag reflex deferred. Head turning & shoulder shrug intact b/l. Tongue midline, normal movements, no atrophy.    Fundoscopic: Not performed.     Motor: Normal muscle bulk & tone. No noticeable tremor or seizure. No pronator drift.              Deltoid	Biceps	Triceps	Wrist	Finger ABd	   R	5	5	5	5	5		5 	  L	5	5	5	5	5		5    	H-Flex	H-Ext	H-ABd	H-ADd	K-Flex	K-Ext	D-Flex	P-Flex  R	5	5	5	5	5	5	5	5 	   L	5	5	5	5	5	5	5	5	     Sensation: Intact to LT/PP/Position b/l throughout. However, pinprick described as feeling more "sensitive" when descending towards the feet, beginning mid-way at the shin. Sensation symmetric.       Reflexes:              Biceps(C5)       BR(C6)     Triceps(C7)               Patellar(L4)    Achilles(S1)    Plantar Resp  R	2	          2	             2		        2		    0		Up  L	2	          2	             2		        2		    0		Up    Coordination: intact rapid-alt movements. No dysmetria to FTN/HTS    Gait: Not assessed.    LABORATORY:  CBC                       13.1   4.17  )-----------( 229      ( 11 Dec 2023 06:26 )             40.0     Chem 12-11    140  |  105  |  18  ----------------------------<  91  4.5   |  23  |  0.86    Ca    9.6      11 Dec 2023 06:26    TPro  6.6  /  Alb  x   /  TBili  x   /  DBili  x   /  AST  x   /  ALT  x   /  AlkPhos  x   12-10    LFTs LIVER FUNCTIONS - ( 10 Dec 2023 06:23 )  Alb: x     / Pro: 6.6 g/dL / ALK PHOS: x     / ALT: x     / AST: x     / GGT: x           U/A Urinalysis Basic - ( 11 Dec 2023 06:26 )    Color: x / Appearance: x / SG: x / pH: x  Gluc: 91 mg/dL / Ketone: x  / Bili: x / Urobili: x   Blood: x / Protein: x / Nitrite: x   Leuk Esterase: x / RBC: x / WBC x   Sq Epi: x / Non Sq Epi: x / Bacteria: x        STUDIES & IMAGING:  Studies (EKG, EEG, EMG, etc):     Radiology (XR, CT, MR, U/S, TTE/NEERU):    MR Cervical Spine w/wo contrast:  Abnormal enhancement along multiple bilateral cervical nerve roots with   dorsal distribution, thin/non-masslike and most consistent with a   polyneuritis.    Distribution is dorsal/sensory and fits symptoms as probable relapse of   GBS-sensory variant, versus other inflammatory condition. Correlate with   CSF sampling.    No cord compression, abnormal signal or definitive enhancement to   indicate myelitis.    MR Thoracic Spine w/wo contrast:  No imaging evidence of Guillain [place] syndrome.    MR Lumbar Spine w/wo contrast:  No imaging evidence of Guillain [place] syndrome.  Left foraminal disc herniation at the L5-S1 level. SUBJECTIVE/INTERVAL HISTORY: Patient is a 44 y/o female who presented to the [facility] ED on 12/8/23 due to complaint of numbness and tingling in her feet and fingertips bilaterally since 11/30. The patients symptoms started in her toes then progressed to her dorsal and ventral feet the following day. About 3 days later, the sensation began in her fingertips. She denies any difficulties with ambulating, strength, or fine finger movements. She denies pain but says the affected areas feel "sensitive". Since her admission, the patient does not admit to any progression of her symptoms. There is no known inciting factor at this time, denies recent immunizations, illnesses, travel. 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 The patient was pregnant at this time. Patient was treated with plasma exchange and PT and she believes she has recovered to 90% of normal since then. Lumbar puncture was discussed with the patient and she is agreeable.     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)      No significant past surgical history        FAMILY HISTORY:      MEDICATIONS (HOME):  Home Medications:    MEDICATIONS  (STANDING):  enoxaparin Injectable 40 milliGRAM(s) SubCutaneous every 24 hours  influenza   Vaccine 0.5 milliLiter(s) IntraMuscular once    MEDICATIONS  (PRN):    ALLERGIES/INTOLERANCES:  Allergies  No Known Allergies    Intolerances    VITALS & EXAMINATION:  Vital Signs Last 24 Hrs  T(C): 36.8 (11 Dec 2023 09:28), Max: 37.1 (10 Dec 2023 12:15)  T(F): 98.3 (11 Dec 2023 09:28), Max: 98.7 (10 Dec 2023 12:15)  HR: 71 (11 Dec 2023 09:28) (52 - 75)  BP: 123/81 (11 Dec 2023 09:28) (100/63 - 124/84)  BP(mean): --  RR: 18 (11 Dec 2023 09:28) (18 - 18)  SpO2: 97% (11 Dec 2023 09:28) (95% - 99%)    Parameters below as of 11 Dec 2023 04:12  Patient On (Oxygen Delivery Method): room air        General:  Constitutional: Overweight Female, appears stated age, in no apparent distress including pain  Head: Normocephalic & atraumatic.  ENT: Neck supple, no lymphadenopathy.   Respiratory: Patent airway.   Extremities: No cyanosis, clubbing, or edema.  Skin: No rashes, bruising, or discoloration.    Cardiovascular (>2): Palpable pulses. Normal capillary beds refill, 1-2 seconds or less.     Neurological (>12):  MS: Awake, alert, oriented to person, place, situation, time. Normal affect. Follows all commands.    Language: Speech is clear, fluent with good repetition & comprehension. Able to name objects: pen, phone, thumb.    CNs: PERRLA (R = 4mm, L = 4mm). VFF. EOMI no nystagmus, no diplopia. V1-3 intact to LT, well developed masseter muscles b/l. No facial asymmetry b/l, full eye closure strength b/l. Hearing grossly normal (rubbing fingers) b/l. Symmetric palate elevation in midline. Gag reflex deferred. Head turning & shoulder shrug intact b/l. Tongue midline, normal movements, no atrophy.    Fundoscopic: Not performed.     Motor: Normal muscle bulk & tone. No noticeable tremor or seizure. No pronator drift.              Deltoid	Biceps	Triceps	Wrist	Finger ABd	   R	5	5	5	5	5		5 	  L	5	5	5	5	5		5    	H-Flex	H-Ext	H-ABd	H-ADd	K-Flex	K-Ext	D-Flex	P-Flex  R	5	5	5	5	5	5	5	5 	   L	5	5	5	5	5	5	5	5	     Sensation: Intact to LT/PP/Position b/l throughout. However, pinprick described as feeling more "sensitive" when descending towards the feet, beginning mid-way at the shin. Sensation symmetric.       Reflexes:              Biceps(C5)       BR(C6)     Triceps(C7)               Patellar(L4)    Achilles(S1)    Plantar Resp  R	2	          2	             2		        2		    0		Up  L	2	          2	             2		        2		    0		Up    Coordination: intact rapid-alt movements. No dysmetria to FTN/HTS    Gait: Not assessed.    LABORATORY:  CBC                       13.1   4.17  )-----------( 229      ( 11 Dec 2023 06:26 )             40.0     Chem 12-11    140  |  105  |  18  ----------------------------<  91  4.5   |  23  |  0.86    Ca    9.6      11 Dec 2023 06:26    TPro  6.6  /  Alb  x   /  TBili  x   /  DBili  x   /  AST  x   /  ALT  x   /  AlkPhos  x   12-10    LFTs LIVER FUNCTIONS - ( 10 Dec 2023 06:23 )  Alb: x     / Pro: 6.6 g/dL / ALK PHOS: x     / ALT: x     / AST: x     / GGT: x           U/A Urinalysis Basic - ( 11 Dec 2023 06:26 )    Color: x / Appearance: x / SG: x / pH: x  Gluc: 91 mg/dL / Ketone: x  / Bili: x / Urobili: x   Blood: x / Protein: x / Nitrite: x   Leuk Esterase: x / RBC: x / WBC x   Sq Epi: x / Non Sq Epi: x / Bacteria: x        STUDIES & IMAGING:  Studies (EKG, EEG, EMG, etc):     Radiology (XR, CT, MR, U/S, TTE/NEERU):    MR Cervical Spine w/wo contrast:  Abnormal enhancement along multiple bilateral cervical nerve roots with   dorsal distribution, thin/non-masslike and most consistent with a   polyneuritis.    Distribution is dorsal/sensory and fits symptoms as probable relapse of   GBS-sensory variant, versus other inflammatory condition. Correlate with   CSF sampling.    No cord compression, abnormal signal or definitive enhancement to   indicate myelitis.    MR Thoracic Spine w/wo contrast:  No imaging evidence of Guillain [place] syndrome.    MR Lumbar Spine w/wo contrast:  No imaging evidence of Guillain [place] syndrome.  Left foraminal disc herniation at the L5-S1 level. SUBJECTIVE: Patient is a 44 y/o female who presented to the [facility] ED on 12/8/23 due to complaint of numbness and tingling in her feet and fingertips bilaterally since 11/30. The patients symptoms started in her toes then progressed to her dorsal and ventral feet the following day. About 3 days later, the sensation began in her fingertips. She denies any difficulties with ambulating, strength, or fine finger movements. She denies pain but says the affected areas feel "sensitive". Since her admission, the patient does not admit to any progression of her symptoms. There is no known inciting factor at this time, denies recent immunizations, illnesses, travel. 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 The patient was pregnant at this time. Patient was treated with plasma exchange and PT and she believes she has recovered to 90% of normal since then.     INTERVAL HISTORY: No acute events overnight. Lumbar puncture was discussed with the patient and she is agreeable.     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)      No significant past surgical history        FAMILY HISTORY:      MEDICATIONS (HOME):  Home Medications:    MEDICATIONS  (STANDING):  enoxaparin Injectable 40 milliGRAM(s) SubCutaneous every 24 hours  influenza   Vaccine 0.5 milliLiter(s) IntraMuscular once    MEDICATIONS  (PRN):    ALLERGIES/INTOLERANCES:  Allergies  No Known Allergies    Intolerances    VITALS & EXAMINATION:  Vital Signs Last 24 Hrs  T(C): 36.8 (11 Dec 2023 09:28), Max: 37.1 (10 Dec 2023 12:15)  T(F): 98.3 (11 Dec 2023 09:28), Max: 98.7 (10 Dec 2023 12:15)  HR: 71 (11 Dec 2023 09:28) (52 - 75)  BP: 123/81 (11 Dec 2023 09:28) (100/63 - 124/84)  BP(mean): --  RR: 18 (11 Dec 2023 09:28) (18 - 18)  SpO2: 97% (11 Dec 2023 09:28) (95% - 99%)    Parameters below as of 11 Dec 2023 04:12  Patient On (Oxygen Delivery Method): room air        General:  Constitutional: Overweight Female, appears stated age, in no apparent distress including pain  Head: Normocephalic & atraumatic.  ENT: Neck supple, no lymphadenopathy.   Respiratory: No increased work of breathing at rest  Extremities: No cyanosis, clubbing, or edema.  Skin: No rashes, bruising, or discoloration.    Neurological (>12):  MS: Awake, alert, oriented to person, place, situation, time. Normal affect. Follows all commands.    Language: Speech is clear, fluent with good repetition & comprehension. Able to name objects: pen, phone, thumb.    CNs: PERRLA (R = 4mm, L = 4mm). VFF. EOMI no nystagmus, no diplopia. V1-3 intact to LT, well developed masseter muscles b/l. No facial asymmetry b/l, full eye closure strength b/l. Hearing grossly normal (rubbing fingers) b/l. Symmetric palate elevation in midline. Gag reflex deferred. Head turning & shoulder shrug intact b/l. Tongue midline, normal movements, no atrophy.    Fundoscopic: Not performed.     Motor: Normal muscle bulk & tone. No noticeable tremor or seizure. No pronator drift.              Deltoid	Biceps	Triceps	Wrist	Finger ABd	   R	5	5	5	5	5		5 	  L	5	5	5	5	5		5    	H-Flex	H-Ext	H-ABd	H-ADd	K-Flex	K-Ext	D-Flex	P-Flex  R	5	5	5	5	5	5	5	5 	   L	5	5	5	5	5	5	5	5	     Sensation: Intact to LT/PP/Position b/l throughout. However, pinprick described as feeling more "sensitive" when descending towards the feet, beginning mid-way at the shin. Sensation symmetric.       Reflexes:              Biceps(C5)       BR(C6)     Triceps(C7)               Patellar(L4)    Achilles(S1)    Plantar Resp  R	2	          2	             2		        2		    0		Up  L	2	          2	             2		        2		    0		Up    Coordination: intact rapid-alt movements. No dysmetria to FTN/HTS    Gait: Not assessed.    LABORATORY:  CBC                       13.1   4.17  )-----------( 229      ( 11 Dec 2023 06:26 )             40.0     Chem 12-11    140  |  105  |  18  ----------------------------<  91  4.5   |  23  |  0.86    Ca    9.6      11 Dec 2023 06:26    TPro  6.6  /  Alb  x   /  TBili  x   /  DBili  x   /  AST  x   /  ALT  x   /  AlkPhos  x   12-10    LFTs LIVER FUNCTIONS - ( 10 Dec 2023 06:23 )  Alb: x     / Pro: 6.6 g/dL / ALK PHOS: x     / ALT: x     / AST: x     / GGT: x           U/A Urinalysis Basic - ( 11 Dec 2023 06:26 )    Color: x / Appearance: x / SG: x / pH: x  Gluc: 91 mg/dL / Ketone: x  / Bili: x / Urobili: x   Blood: x / Protein: x / Nitrite: x   Leuk Esterase: x / RBC: x / WBC x   Sq Epi: x / Non Sq Epi: x / Bacteria: x        STUDIES & IMAGING:  Studies (EKG, EEG, EMG, etc):     Radiology (XR, CT, MR, U/S, TTE/NEERU):    MR Cervical Spine w/wo contrast:  Abnormal enhancement along multiple bilateral cervical nerve roots with   dorsal distribution, thin/non-masslike and most consistent with a   polyneuritis.    Distribution is dorsal/sensory and fits symptoms as probable relapse of   GBS-sensory variant, versus other inflammatory condition. Correlate with   CSF sampling.    No cord compression, abnormal signal or definitive enhancement to   indicate myelitis.    MR Thoracic Spine w/wo contrast:  No imaging evidence of Guillain [place] syndrome.    MR Lumbar Spine w/wo contrast:  No imaging evidence of Guillain [place] syndrome.  Left foraminal disc herniation at the L5-S1 level. SUBJECTIVE: Patient is a 44 y/o female who presented to the [facility] ED on 12/8/23 due to complaint of numbness and tingling in her feet and fingertips bilaterally since 11/30. The patients symptoms started in her toes then progressed to her dorsal and ventral feet the following day. About 3 days later, the sensation began in her fingertips. She denies any difficulties with ambulating, strength, or fine finger movements. She denies pain but says the affected areas feel "sensitive". Since her admission, the patient does not admit to any progression of her symptoms. There is no known inciting factor at this time, denies recent immunizations, illnesses, travel. 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 The patient was pregnant at this time. Patient was treated with plasma exchange and PT and she believes she has recovered to 90% of normal since then.     INTERVAL HISTORY: No acute events overnight. Lumbar puncture was discussed with the patient and she is agreeable.     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)      No significant past surgical history        FAMILY HISTORY:      MEDICATIONS (HOME):  Home Medications:    MEDICATIONS  (STANDING):  enoxaparin Injectable 40 milliGRAM(s) SubCutaneous every 24 hours  influenza   Vaccine 0.5 milliLiter(s) IntraMuscular once    MEDICATIONS  (PRN):    ALLERGIES/INTOLERANCES:  Allergies  No Known Allergies    Intolerances    VITALS & EXAMINATION:  Vital Signs Last 24 Hrs  T(C): 36.8 (11 Dec 2023 09:28), Max: 37.1 (10 Dec 2023 12:15)  T(F): 98.3 (11 Dec 2023 09:28), Max: 98.7 (10 Dec 2023 12:15)  HR: 71 (11 Dec 2023 09:28) (52 - 75)  BP: 123/81 (11 Dec 2023 09:28) (100/63 - 124/84)  BP(mean): --  RR: 18 (11 Dec 2023 09:28) (18 - 18)  SpO2: 97% (11 Dec 2023 09:28) (95% - 99%)    Parameters below as of 11 Dec 2023 04:12  Patient On (Oxygen Delivery Method): room air        General:  Constitutional: Overweight Female, appears stated age, in no apparent distress including pain  Head: Normocephalic & atraumatic.  ENT: Neck supple, no lymphadenopathy.   Respiratory: No increased work of breathing at rest  Extremities: No cyanosis, clubbing, or edema.  Skin: No rashes, bruising, or discoloration.    Neurological (>12):  MS: Awake, alert, oriented to person, place, situation, time. Normal affect. Follows all commands.    Language: Speech is clear, fluent with good repetition & comprehension. Able to name objects: pen, phone, thumb.    CNs: PERRLA (R = 4mm, L = 4mm). VFF. EOMI no nystagmus, no diplopia. V1-3 intact to LT, well developed masseter muscles b/l. No facial asymmetry b/l, full eye closure strength b/l. Hearing grossly normal (rubbing fingers) b/l. Symmetric palate elevation in midline. Gag reflex deferred. Head turning & shoulder shrug intact b/l. Tongue midline, normal movements, no atrophy.    Fundoscopic: Not performed.     Motor: Normal muscle bulk & tone. No noticeable tremor or seizure. No pronator drift.              Deltoid	Biceps	Triceps	Wrist	Finger ABd	   R	5	5	5	5	5		5 	  L	5	5	5	5	5		5    	H-Flex	H-Ext	H-ABd	H-ADd	K-Flex	K-Ext	D-Flex	P-Flex  R	5	5	5	5	5	5	5	5 	   L	5	5	5	5	5	5	5	5	     Sensation: Intact to LT/PP/Position b/l throughout. However, pinprick described as feeling more "sensitive" when descending towards the feet, beginning mid-way at the shin. Sensation symmetric.       Reflexes:              Biceps(C5)       BR(C6)     Triceps(C7)               Patellar(L4)    Achilles(S1)    Plantar Resp  R	2	          2	             2		        2		    0		Up  L	2	          2	             2		        2		    0		Up    Coordination: intact rapid-alt movements. No dysmetria to FTN/HTS    Gait: Not assessed.    LABORATORY:  CBC                       13.1   4.17  )-----------( 229      ( 11 Dec 2023 06:26 )             40.0     Chem 12-11    140  |  105  |  18  ----------------------------<  91  4.5   |  23  |  0.86    Ca    9.6      11 Dec 2023 06:26    TPro  6.6  /  Alb  x   /  TBili  x   /  DBili  x   /  AST  x   /  ALT  x   /  AlkPhos  x   12-10    LFTs LIVER FUNCTIONS - ( 10 Dec 2023 06:23 )  Alb: x     / Pro: 6.6 g/dL / ALK PHOS: x     / ALT: x     / AST: x     / GGT: x           U/A Urinalysis Basic - ( 11 Dec 2023 06:26 )    Color: x / Appearance: x / SG: x / pH: x  Gluc: 91 mg/dL / Ketone: x  / Bili: x / Urobili: x   Blood: x / Protein: x / Nitrite: x   Leuk Esterase: x / RBC: x / WBC x   Sq Epi: x / Non Sq Epi: x / Bacteria: x        STUDIES & IMAGING:  Studies (EKG, EEG, EMG, etc):     Radiology (XR, CT, MR, U/S, TTE/NEERU):    MR Cervical Spine w/wo contrast:  Abnormal enhancement along multiple bilateral cervical nerve roots with   dorsal distribution, thin/non-masslike and most consistent with a   polyneuritis.    Distribution is dorsal/sensory and fits symptoms as probable relapse of   GBS-sensory variant, versus other inflammatory condition. Correlate with   CSF sampling.    No cord compression, abnormal signal or definitive enhancement to   indicate myelitis.    MR Thoracic Spine w/wo contrast:  No imaging evidence of [place]ain [place] syndrome.    MR Lumbar Spine w/wo contrast:  No imaging evidence of [place]ain [place] syndrome.  Left foraminal disc herniation at the L5-S1 level.

## 2023-12-11 NOTE — PROGRESS NOTE ADULT - ASSESSMENT
Ileana Santana is a 43-year-old woman, with PMH significant for AIDP and abnormal Pap smear - treated with LEEP, who presents to [facility]  ED on 12/8/2023, at the behest of her outpatient neurologist, with c/o b/l tingling and numbness in her feet and fingertips. 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 Reports current symptoms are similar to her AIDP symptoms. Previous course of AIDP began with tingling/numbness in her feet. Despite early use of PLEX - patient progressed to have further leg numbness/weakness, as well as arm weakness. No respiratory involvement per patient report. Denies recent illness including diarrheal illness. No recent vaccines. Not on any medications per her report. ED vitals notable for: Tmax 37.6, HR to 81, BP to 142/83, RR 18, SpO2 % on RA. Labs thus far are unrevealing. Exam notable for impaired STM, abnormal plantar response b/l, c/o reduced sensation to LT/pinprick + paresthesias: feet, legs, fingertips. No ophthalmoplegia, ataxia, reflexes are largely intact.    Impression: Paresthesias/numbness affecting the bilateral hands/feet in a patient with previous diagnosis of AIDP (treated with plasma exchange) - r/o AIDP, r/o other demyelinating disease    Plan:  [] Baseline NIF/VC  [] Labs: CBC, CMP, ganglioside Abs panel, paraneoplastic autoantibody panel, A1c, lipid panel, UA, UTox, ferritin, total iron with TIBC, ACE, vitamin B1/B6/B12/D/E, folate, Lyme, WNV, copper, zinc, CK, ESR, CRP, TSH, T3/T4, RPR, RVP  [] Follow up urine HCG  [] Imaging: MR C/T/L spine w/wo IV contrast  [] Based on laboratory and radiologic findings, will consider lumbar puncture  [] Reached out to Dr. Wong's office - left message. If no response this weekend, please reach out on Monday with an update.  [] PT/OT  [] CM/SW  [] Diet: Regular  [] DVT prophylaxis: enoxaparin 40mg subcutaneous q24h    Patient/plan d/w Dr. Baird. To be seen by General Neurology attending in the AM with attestation to follow. Plan is not formalized until attending attestation is complete.   Ileana Santana is a 43-year-old woman, with PMH significant for AIDP and abnormal Pap smear - treated with LEEP, who presents to [facility]  ED on 12/8/2023, at the behest of her outpatient neurologist, with c/o b/l tingling and numbness in her feet and fingertips. 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 Reports current symptoms are similar to her AIDP symptoms. Previous course of AIDP began with tingling/numbness in her feet. Despite early use of PLEX - patient progressed to have further leg numbness/weakness, as well as arm weakness. No respiratory involvement per patient report. Denies recent illness including diarrheal illness. No recent vaccines. Not on any medications per her report. ED vitals notable for: Tmax 37.6, HR to 81, BP to 142/83, RR 18, SpO2 % on RA. Labs thus far are unrevealing. Exam notable for impaired STM, abnormal plantar response b/l, c/o reduced sensation to LT/pinprick + paresthesias: feet, legs, fingertips. No ophthalmoplegia, ataxia, reflexes are largely intact.    Impression: Paresthesias/numbness affecting the bilateral hands/feet in a patient with previous diagnosis of AIDP (treated with plasma exchange) - r/o AIDP, r/o other demyelinating disease    Plan:  [] Baseline NIF/VC  [] Labs: CBC, CMP, ganglioside Abs panel, paraneoplastic autoantibody panel, A1c, lipid panel, UA, UTox, ferritin, total iron with TIBC, ACE, vitamin B1/B6/B12/D/E, folate, Lyme, WNV, copper, zinc, CK, ESR, CRP, TSH, T3/T4, RPR, RVP  [] Follow up urine HCG  [] Imaging: MR C/T/L spine w/wo IV contrast  [] Based on laboratory and radiologic findings, will consider lumbar puncture  [] Reached out to Dr. Wong's office - left message. If no response this weekend, please reach out on Monday with an update.  [] PT/OT  [] CM/SW  [] Diet: Regular  [] DVT prophylaxis: enoxaparin 40mg subcutaneous q24h    Patient/plan d/w Dr. Baird. To be seen by General Neurology attending in the AM with attestation to follow. Plan is not formalized until attending attestation is complete.   Ileana Santana is a 43-year-old woman, with PMH significant for AIDP and abnormal Pap smear - treated with LEEP, who presents to [facility]  ED on 12/8/2023, at the behest of her outpatient neurologist, with c/o b/l tingling and numbness in her feet and fingertips. 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 Reports current symptoms are similar to her AIDP symptoms. Previous course of AIDP began with tingling/numbness in her feet. Despite early use of PLEX - patient progressed to have further leg numbness/weakness, as well as arm weakness. No respiratory involvement per patient report. Denies recent illness including diarrheal illness. No recent vaccines. Not on any medications per her report. ED vitals notable for: Tmax 37.6, HR to 81, BP to 142/83, RR 18, SpO2 % on RA. Labs thus far are unrevealing. Exam notable for impaired STM, abnormal plantar response b/l, c/o reduced sensation to LT/pinprick + paresthesias: feet, legs, fingertips. No ophthalmoplegia, ataxia, reflexes are largely intact.    Impression: Paresthesias/numbness affecting the bilateral hands/feet in a patient with previous diagnosis of AIDP (treated with plasma exchange) - r/o AIDP, r/o other demyelinating disease    Plan:  [x] Baseline NIF/VC: NIF: -50 cm H2O, VC 2.92 L  [] Labs: CBC, CMP, ganglioside Abs panel, paraneoplastic autoantibody panel, A1c, lipid panel, UA, UTox, ferritin, total iron with TIBC, ACE, vitamin B1/B6/B12/D/E, folate, Lyme, WNV, copper, zinc, CK, ESR, CRP, TSH, T3/T4, RPR, RVP  [] Follow up urine HCG  [x] Imaging: MR C/T/L spine w/wo IV contrast  [] Based on laboratory and radiologic findings, lumbar puncture today 12/11  [] Reached out to Dr. Wong's office - left message. If no response this weekend, please reach out on Monday with an update.  [] PT/OT  [] CM/SW  [] Diet: Regular  [x] DVT prophylaxis: enoxaparin 40mg subcutaneous q24h    Patient/plan d/w Dr. Alaniz. To be seen by General Neurology attending in the AM with attestation to follow. Plan is not formalized until attending attestation is complete.   Ileana Santana is a 43-year-old woman, with PMH significant for AIDP and abnormal Pap smear - treated with LEEP, who presents to [facility]  ED on 12/8/2023, at the behest of her outpatient neurologist, with c/o b/l tingling and numbness in her feet and fingertips. 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 Reports current symptoms are similar to her AIDP symptoms. Previous course of AIDP began with tingling/numbness in her feet. Despite early use of PLEX - patient progressed to have further leg numbness/weakness, as well as arm weakness. No respiratory involvement per patient report. Denies recent illness including diarrheal illness. No recent vaccines. Not on any medications per her report. ED vitals notable for: Tmax 37.6, HR to 81, BP to 142/83, RR 18, SpO2 % on RA. Labs thus far are unrevealing. Exam notable for impaired STM, abnormal plantar response b/l, c/o reduced sensation to LT/pinprick + paresthesias: feet, legs, fingertips. No ophthalmoplegia, ataxia, reflexes are largely intact.    Impression: Paresthesias/numbness affecting the bilateral hands/feet in a patient with previous diagnosis of AIDP (treated with plasma exchange) - r/o AIDP, r/o other demyelinating disease    Plan:  [x] Baseline NIF/VC: NIF: -50 cm H2O, VC 2.92 L  [] Labs: CBC, CMP, ganglioside Abs panel, paraneoplastic autoantibody panel, A1c, lipid panel, UA, UTox, ferritin, total iron with TIBC, ACE, vitamin B1/B6/B12/D/E, folate, Lyme, WNV, copper, zinc, CK, ESR, CRP, TSH, T3/T4, RPR, RVP  [] Follow up urine HCG  [x] Imaging: MR C/T/L spine w/wo IV contrast  [] Based on laboratory and radiologic findings, lumbar puncture today 12/11  [] Reached out to Dr. Wong's office - left message. If no response this weekend, please reach out on Monday with an update.  [] PT/OT  [] CM/SW  [] Diet: Regular  [x] DVT prophylaxis: enoxaparin 40mg subcutaneous q24h    Patient/plan d/w Dr. Alaniz. To be seen by General Neurology attending in the AM with attestation to follow. Plan is not formalized until attending attestation is complete.   Ileana Santana is a 43-year-old woman, with PMH significant for AIDP and abnormal Pap smear - treated with LEEP, who presents to [facility]  ED on 12/8/2023, at the behest of her outpatient neurologist, with c/o b/l tingling and numbness in her feet and fingertips. 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 Reports current symptoms are similar to her AIDP symptoms. Previous course of AIDP began with tingling/numbness in her feet. Despite early use of PLEX - patient progressed to have further leg numbness/weakness, as well as arm weakness. No respiratory involvement per patient report. Denies recent illness including diarrheal illness. No recent vaccines. Not on any medications per her report. ED vitals notable for: Tmax 37.6, HR to 81, BP to 142/83, RR 18, SpO2 % on RA. Labs thus far are unrevealing. Exam notable for impaired STM, abnormal plantar response b/l, c/o reduced sensation to LT/pinprick + paresthesias: feet, legs, fingertips. No ophthalmoplegia, ataxia, reflexes are largely intact.    Impression: Paresthesias/numbness affecting the bilateral hands/feet in a patient with previous diagnosis of AIDP (treated with plasma exchange) - r/o AIDP, r/o other demyelinating disease    Plan:  [x] Baseline NIF/VC: NIF: -50 cm H2O, VC 2.92 L  [] Labs: CBC, CMP, ganglioside Abs panel, paraneoplastic autoantibody panel, A1c, lipid panel, UA, UTox, ferritin, total iron with TIBC, ACE, vitamin B1/B6/B12/D/E, folate, Lyme, WNV, copper, zinc, CK, ESR, CRP, TSH, T3/T4, RPR, RVP  [x] Follow up urine HCG- negative  [x] Imaging: MR C/T/L spine w/wo IV contrast  [] Based on laboratory and radiologic findings, lumbar puncture today 12/11  [] Reached out to Dr. Wong's office - left message. If no response this weekend, please reach out on Monday with an update.  [] PT/OT  [] CM/SW  [] Diet: Regular  [x] DVT prophylaxis: enoxaparin 40mg subcutaneous q24h    Patient/plan d/w Dr. Alaniz. To be seen by General Neurology attending in the AM with attestation to follow. Plan is not formalized until attending attestation is complete.   Ileana Santana is a 43-year-old woman, with PMH significant for AIDP and abnormal Pap smear - treated with LEEP, who presents to [facility]  ED on 12/8/2023, at the behest of her outpatient neurologist, with c/o b/l tingling and numbness in her feet and fingertips. 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 Reports current symptoms are similar to her AIDP symptoms. Previous course of AIDP began with tingling/numbness in her feet. Despite early use of PLEX - patient progressed to have further leg numbness/weakness, as well as arm weakness. No respiratory involvement per patient report. Denies recent illness including diarrheal illness. No recent vaccines. Not on any medications per her report. ED vitals notable for: Tmax 37.6, HR to 81, BP to 142/83, RR 18, SpO2 % on RA. Labs thus far are unrevealing. Exam notable for impaired STM, abnormal plantar response b/l, c/o reduced sensation to LT/pinprick + paresthesias: feet, legs, fingertips. No ophthalmoplegia, ataxia, reflexes are largely intact.    Impression: Paresthesias/numbness affecting the bilateral hands/feet in a patient with previous diagnosis of AIDP (treated with plasma exchange) - r/o AIDP, r/o other demyelinating disease    Plan:  [x] Baseline NIF/VC: NIF: -50 cm H2O, VC 2.92 L  [] Labs: CBC, CMP, ganglioside Abs panel, paraneoplastic autoantibody panel, A1c, lipid panel, UA, UTox, ferritin, total iron with TIBC, ACE, vitamin B1/B6/B12/D/E, folate, Lyme, WNV, copper, zinc, CK, ESR, CRP, TSH, T3/T4, RPR, RVP  [x] Follow up urine HCG- negative  [x] Imaging: MR C/T/L spine w/wo IV contrast  [] Based on laboratory and radiologic findings, lumbar puncture today 12/11  [] Reached out to Dr. Wong's office - left message. If no response this weekend, please reach out on Monday with an update.  [] PT/OT  [] CM/SW  [] Diet: Regular  [x] DVT prophylaxis: enoxaparin 40mg subcutaneous q24h    Patient/plan d/w Dr. Alaniz. To be seen by General Neurology attending in the AM with attestation to follow. Plan is not formalized until attending attestation is complete.   Ileana Santana is a 43-year-old woman, with PMH significant for AIDP and abnormal Pap smear - treated with LEEP, who presents to [facility]  ED on 12/8/2023, at the behest of her outpatient neurologist, with c/o b/l tingling and numbness in her feet and fingertips. 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 Reports current symptoms are similar to her AIDP symptoms. Previous course of AIDP began with tingling/numbness in her feet. Despite early use of PLEX - patient progressed to have further leg numbness/weakness, as well as arm weakness. No respiratory involvement per patient report. Denies recent illness including diarrheal illness. No recent vaccines. Not on any medications per her report. ED vitals notable for: Tmax 37.6, HR to 81, BP to 142/83, RR 18, SpO2 % on RA. Labs thus far are unrevealing. Exam notable for impaired STM, abnormal plantar response b/l, c/o reduced sensation to LT/pinprick + paresthesias: feet, legs, fingertips. No ophthalmoplegia, ataxia, reflexes are largely intact.    Impression: Paresthesias/numbness affecting the bilateral hands/feet in a patient with previous diagnosis of AIDP (treated with plasma exchange) - r/o AIDP, r/o other demyelinating disease    Plan:  [x] Baseline NIF/VC: NIF: -50 cm H2O, VC 2.92 L  [] Labs: CBC, CMP, ganglioside Abs panel, paraneoplastic autoantibody panel, A1c, lipid panel, UA, UTox, ferritin, total iron with TIBC, ACE, vitamin B1/B6/B12/D/E, folate, Lyme, WNV, copper, zinc, CK, ESR, CRP, TSH, T3/T4, RPR, RVP  [x] Follow up urine HCG- negative  [x] Imaging: MR C/T/L spine w/wo IV contrast  [] Based on laboratory and radiologic findings, lumbar puncture today 12/11  [] Reached out to Dr. Wong's office - left message. If no response this weekend, please reach out on Monday with an update.  [x] PT/OT  [] CM/SW  [x] Diet: Regular  [x] DVT prophylaxis: enoxaparin 40mg subcutaneous q24h    Patient/plan d/w Dr. Alaniz. To be seen by General Neurology attending in the AM with attestation to follow. Plan is not formalized until attending attestation is complete.   Ileana Santana is a 43-year-old woman, with PMH significant for AIDP and abnormal Pap smear - treated with LEEP, who presents to [facility]  ED on 12/8/2023, at the behest of her outpatient neurologist, with c/o b/l tingling and numbness in her feet and fingertips. 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 Reports current symptoms are similar to her AIDP symptoms. Previous course of AIDP began with tingling/numbness in her feet. Despite early use of PLEX - patient progressed to have further leg numbness/weakness, as well as arm weakness. No respiratory involvement per patient report. Denies recent illness including diarrheal illness. No recent vaccines. Not on any medications per her report. ED vitals notable for: Tmax 37.6, HR to 81, BP to 142/83, RR 18, SpO2 % on RA. Labs thus far are unrevealing. Exam notable for impaired STM, abnormal plantar response b/l, c/o reduced sensation to LT/pinprick + paresthesias: feet, legs, fingertips. No ophthalmoplegia, ataxia, reflexes are largely intact.    Impression: Paresthesias/numbness affecting the bilateral hands/feet in a patient with previous diagnosis of AIDP (treated with plasma exchange) - r/o AIDP, r/o other demyelinating disease    Plan:  [x] Baseline NIF/VC: NIF: -50 cm H2O, VC 2.92 L  [] Labs: CBC, CMP, ganglioside Abs panel, paraneoplastic autoantibody panel, A1c, lipid panel, UA, UTox, ferritin, total iron with TIBC, ACE, vitamin B1/B6/B12/D/E, folate, Lyme, WNV, copper, zinc, CK, ESR, CRP, TSH, T3/T4, RPR, RVP  [x] Follow up urine HCG- negative  [x] Imaging: MR C/T/L spine w/wo IV contrast  [] Based on laboratory and radiologic findings, lumbar puncture today 12/11  [] Reached out to Dr. Wong's office - left message. If no response this weekend, please reach out on Monday with an update.  [x] PT/OT  [] CM/SW  [x] Diet: Regular  [x] DVT prophylaxis: enoxaparin 40mg subcutaneous q24h    Patient/plan d/w Dr. Alaniz. To be seen by General Neurology attending in the AM with attestation to follow. Plan is not formalized until attending attestation is complete.   Ileana Santana is a 43-year-old woman, with PMH significant for AIDP (dx age 17 while pregnant, tx PLEX, symptoms resolved) and abnormal Pap smear - treated with LEEP, who presents to [facility] ED on 12/8/2023, at the behest of her outpatient neurologist, with c/o b/l tingling and numbness in her feet and fingertips. Reports current symptoms of b/l foot numbness/tingling are similar to her prior AIDP symptoms. Previous course of AIDP began with tingling/numbness in her feet with wornening weakness. No respiratory involvement per patient report. Denies recent illness including diarrheal illness. No recent vaccines. Not on any medications per her report. VSS and labs unrevealing, urine HCG- negative. Exam notable for impaired STM, abnormal plantar response b/l, c/o reduced sensation to LT/pinprick + paresthesias: feet, legs, fingertips. No ophthalmoplegia, ataxia, reflexes are largely intact.    Impression: Paresthesias/numbness affecting the bilateral hands/feet in a patient with previous diagnosis of AIDP (treated with plasma exchange) - r/o AIDP, r/o other demyelinating disease    Plan:  [x] Baseline NIF/VC: NIF: -50 cm H2O, VC 2.92 L  [] Labs: CBC, CMP, ganglioside Abs panel, paraneoplastic autoantibody panel, A1c, lipid panel, UA, UTox, ferritin, total iron with TIBC, ACE, vitamin B1/B6/B12/D/E, folate, Lyme, WNV, copper, zinc, CK, ESR, CRP, TSH, T3/T4, RPR, RVP  [x] Imaging: MR C/T/L spine w/wo IV contrast - reviewed with neuroradiology 12/11/23, unremarkable  [] plan for lumbar puncture  [] will call Dr. Wong's office   [x] PT/OT  [x] Diet: Regular  [x] DVT prophylaxis: enoxaparin 40mg subcutaneous q24h; held for LP  [] when ready for discharge, outpatient f/u with her neurologist Dr. Tomasa Wong    Patient/plan seen and d/w Dr. Alaniz. To be seen by General Neurology attending in the AM with attestation to follow. Plan is not formalized until attending attestation is complete.   Ileana Santana is a 43-year-old woman, with PMH significant for AIDP (dx age 17 while pregnant, tx PLEX, symptoms resolved) and abnormal Pap smear - treated with LEEP, who presents to [facility] ED on 12/8/2023, at the behest of her outpatient neurologist, with c/o b/l tingling and numbness in her feet and fingertips. Reports current symptoms of b/l foot numbness/tingling are similar to her prior AIDP symptoms. Previous course of AIDP began with tingling/numbness in her feet with wornening weakness. No respiratory involvement per patient report. Denies recent illness including diarrheal illness. No recent vaccines. Not on any medications per her report. VSS and labs unrevealing, urine HCG- negative. Exam notable for impaired STM, abnormal plantar response b/l, c/o reduced sensation to LT/pinprick + paresthesias: feet, legs, fingertips. No ophthalmoplegia, ataxia, reflexes are largely intact.    Impression: Paresthesias/numbness affecting the bilateral hands/feet in a patient with previous diagnosis of AIDP (treated with plasma exchange) - r/o AIDP, r/o other demyelinating disease    Plan:  [x] Baseline NIF/VC: NIF: -50 cm H2O, VC 2.92 L  [] Labs: CBC, CMP, ganglioside Abs panel, paraneoplastic autoantibody panel, A1c, lipid panel, UA, UTox, ferritin, total iron with TIBC, ACE, vitamin B1/B6/B12/D/E, folate, Lyme, WNV, copper, zinc, CK, ESR, CRP, TSH, T3/T4, RPR, RVP  [x] Imaging: MR C/T/L spine w/wo IV contrast - reviewed with neuroradiology 12/11/23, unremarkable  [] plan for lumbar puncture  [] will call Dr. Wong's office   [x] PT/OT  [x] Diet: Regular  [x] DVT prophylaxis: enoxaparin 40mg subcutaneous q24h; held for LP  [] when ready for discharge, outpatient f/u with her neurologist Dr. Tomasa Wong    Patient/plan seen and d/w Dr. Alaniz. To be seen by General Neurology attending in the AM with attestation to follow. Plan is not formalized until attending attestation is complete.

## 2023-12-11 NOTE — PROCEDURE NOTE - ADDITIONAL PROCEDURE DETAILS
Informed consent obtained from patient. Risk and benefit discussed and informed about infectious, bleeding and perforation of internal organ risk. Patient was seated. Area was cleaned in a sterile fashion. 20 G spinal needle was inserted with successful CSF fluid collection from first attempt at L4-L5 obtained. Procedure tolerated well by patient. Minimal blood loss noted. Patient instructed to lie down for an hour.

## 2023-12-12 LAB
ALBUMIN CSF-MCNC: 33.2 MG/DL — HIGH (ref 14–25)
ALBUMIN CSF-MCNC: 33.2 MG/DL — HIGH (ref 14–25)
ALBUMIN SERPL ELPH-MCNC: 3432 MG/DL — LOW (ref 3500–5200)
ALBUMIN SERPL ELPH-MCNC: 3432 MG/DL — LOW (ref 3500–5200)
ANA TITR SER: NEGATIVE — SIGNIFICANT CHANGE UP
ANA TITR SER: NEGATIVE — SIGNIFICANT CHANGE UP
APPEARANCE UR: CLEAR — SIGNIFICANT CHANGE UP
APPEARANCE UR: CLEAR — SIGNIFICANT CHANGE UP
AUTO DIFF PNL BLD: NEGATIVE — SIGNIFICANT CHANGE UP
AUTO DIFF PNL BLD: NEGATIVE — SIGNIFICANT CHANGE UP
BACTERIA # UR AUTO: NEGATIVE /HPF — SIGNIFICANT CHANGE UP
BACTERIA # UR AUTO: NEGATIVE /HPF — SIGNIFICANT CHANGE UP
BILIRUB UR-MCNC: NEGATIVE — SIGNIFICANT CHANGE UP
BILIRUB UR-MCNC: NEGATIVE — SIGNIFICANT CHANGE UP
C-ANCA SER-ACNC: NEGATIVE — SIGNIFICANT CHANGE UP
C-ANCA SER-ACNC: NEGATIVE — SIGNIFICANT CHANGE UP
CAST: 0 /LPF — SIGNIFICANT CHANGE UP (ref 0–4)
CAST: 0 /LPF — SIGNIFICANT CHANGE UP (ref 0–4)
COLOR SPEC: YELLOW — SIGNIFICANT CHANGE UP
COLOR SPEC: YELLOW — SIGNIFICANT CHANGE UP
DIFF PNL FLD: ABNORMAL
DIFF PNL FLD: ABNORMAL
DSDNA AB FLD-ACNC: <0.2 AI — SIGNIFICANT CHANGE UP
DSDNA AB FLD-ACNC: <0.2 AI — SIGNIFICANT CHANGE UP
DSDNA AB SER-ACNC: <12 IU/ML — SIGNIFICANT CHANGE UP
DSDNA AB SER-ACNC: <12 IU/ML — SIGNIFICANT CHANGE UP
ENA SS-A AB FLD IA-ACNC: <0.2 AI — SIGNIFICANT CHANGE UP
ENA SS-A AB FLD IA-ACNC: <0.2 AI — SIGNIFICANT CHANGE UP
GLUCOSE UR QL: NEGATIVE MG/DL — SIGNIFICANT CHANGE UP
GLUCOSE UR QL: NEGATIVE MG/DL — SIGNIFICANT CHANGE UP
IGA FLD-MCNC: 249 MG/DL — SIGNIFICANT CHANGE UP (ref 84–499)
IGA FLD-MCNC: 249 MG/DL — SIGNIFICANT CHANGE UP (ref 84–499)
IGG CSF-MCNC: 6.3 MG/DL — HIGH
IGG CSF-MCNC: 6.3 MG/DL — HIGH
IGG FLD-MCNC: 1166 MG/DL — SIGNIFICANT CHANGE UP (ref 610–1660)
IGG FLD-MCNC: 1166 MG/DL — SIGNIFICANT CHANGE UP (ref 610–1660)
IGG FLD-MCNC: 1333 MG/DL — SIGNIFICANT CHANGE UP (ref 610–1660)
IGG FLD-MCNC: 1333 MG/DL — SIGNIFICANT CHANGE UP (ref 610–1660)
IGG SYNTH RATE SER+CSF CALC-MRATE: 2.3 MG/DAY — SIGNIFICANT CHANGE UP
IGG SYNTH RATE SER+CSF CALC-MRATE: 2.3 MG/DAY — SIGNIFICANT CHANGE UP
IGG/ALB CLEAR SER+CSF-RTO: 0.6 — SIGNIFICANT CHANGE UP
IGG/ALB CLEAR SER+CSF-RTO: 0.6 — SIGNIFICANT CHANGE UP
IGG/ALB CSF: 0.19 RATIO — SIGNIFICANT CHANGE UP
IGG/ALB CSF: 0.19 RATIO — SIGNIFICANT CHANGE UP
IGG/ALB SER: 0.34 RATIO — SIGNIFICANT CHANGE UP
IGG/ALB SER: 0.34 RATIO — SIGNIFICANT CHANGE UP
IGM SERPL-MCNC: 74 MG/DL — SIGNIFICANT CHANGE UP (ref 35–242)
IGM SERPL-MCNC: 74 MG/DL — SIGNIFICANT CHANGE UP (ref 35–242)
KAPPA LC SER QL IFE: 2.09 MG/DL — HIGH (ref 0.33–1.94)
KAPPA LC SER QL IFE: 2.09 MG/DL — HIGH (ref 0.33–1.94)
KAPPA/LAMBDA FREE LIGHT CHAIN RATIO, SERUM: 2.79 RATIO — HIGH (ref 0.26–1.65)
KAPPA/LAMBDA FREE LIGHT CHAIN RATIO, SERUM: 2.79 RATIO — HIGH (ref 0.26–1.65)
KETONES UR-MCNC: NEGATIVE MG/DL — SIGNIFICANT CHANGE UP
KETONES UR-MCNC: NEGATIVE MG/DL — SIGNIFICANT CHANGE UP
LAMBDA LC SER QL IFE: 0.75 MG/DL — SIGNIFICANT CHANGE UP (ref 0.57–2.63)
LAMBDA LC SER QL IFE: 0.75 MG/DL — SIGNIFICANT CHANGE UP (ref 0.57–2.63)
LEUKOCYTE ESTERASE UR-ACNC: ABNORMAL
LEUKOCYTE ESTERASE UR-ACNC: ABNORMAL
NITRITE UR-MCNC: NEGATIVE — SIGNIFICANT CHANGE UP
NITRITE UR-MCNC: NEGATIVE — SIGNIFICANT CHANGE UP
P-ANCA SER-ACNC: NEGATIVE — SIGNIFICANT CHANGE UP
P-ANCA SER-ACNC: NEGATIVE — SIGNIFICANT CHANGE UP
PH UR: 6.5 — SIGNIFICANT CHANGE UP (ref 5–8)
PH UR: 6.5 — SIGNIFICANT CHANGE UP (ref 5–8)
PROT UR-MCNC: NEGATIVE MG/DL — SIGNIFICANT CHANGE UP
PROT UR-MCNC: NEGATIVE MG/DL — SIGNIFICANT CHANGE UP
RBC CASTS # UR COMP ASSIST: 6 /HPF — HIGH (ref 0–4)
RBC CASTS # UR COMP ASSIST: 6 /HPF — HIGH (ref 0–4)
SP GR SPEC: 1.02 — SIGNIFICANT CHANGE UP (ref 1–1.03)
SP GR SPEC: 1.02 — SIGNIFICANT CHANGE UP (ref 1–1.03)
SQUAMOUS # UR AUTO: 4 /HPF — SIGNIFICANT CHANGE UP (ref 0–5)
SQUAMOUS # UR AUTO: 4 /HPF — SIGNIFICANT CHANGE UP (ref 0–5)
UROBILINOGEN FLD QL: 0.2 MG/DL — SIGNIFICANT CHANGE UP (ref 0.2–1)
UROBILINOGEN FLD QL: 0.2 MG/DL — SIGNIFICANT CHANGE UP (ref 0.2–1)
WBC UR QL: 18 /HPF — HIGH (ref 0–5)
WBC UR QL: 18 /HPF — HIGH (ref 0–5)

## 2023-12-12 PROCEDURE — 99232 SBSQ HOSP IP/OBS MODERATE 35: CPT

## 2023-12-12 RX ORDER — DIPHENHYDRAMINE HCL 50 MG
25 CAPSULE ORAL DAILY
Refills: 0 | Status: COMPLETED | OUTPATIENT
Start: 2023-12-12 | End: 2023-12-15

## 2023-12-12 RX ORDER — ENOXAPARIN SODIUM 100 MG/ML
40 INJECTION SUBCUTANEOUS EVERY 24 HOURS
Refills: 0 | Status: DISCONTINUED | OUTPATIENT
Start: 2023-12-12 | End: 2023-12-15

## 2023-12-12 RX ORDER — ACETAMINOPHEN 500 MG
650 TABLET ORAL DAILY
Refills: 0 | Status: COMPLETED | OUTPATIENT
Start: 2023-12-12 | End: 2023-12-15

## 2023-12-12 RX ORDER — IMMUNE GLOBULIN (HUMAN) 10 G/100ML
30 INJECTION INTRAVENOUS; SUBCUTANEOUS EVERY 24 HOURS
Refills: 0 | Status: COMPLETED | OUTPATIENT
Start: 2023-12-12 | End: 2023-12-15

## 2023-12-12 RX ADMIN — Medication 650 MILLIGRAM(S): at 12:55

## 2023-12-12 RX ADMIN — Medication 1 TABLET(S): at 17:32

## 2023-12-12 RX ADMIN — Medication 650 MILLIGRAM(S): at 12:25

## 2023-12-12 RX ADMIN — Medication 25 MILLIGRAM(S): at 12:47

## 2023-12-12 RX ADMIN — IMMUNE GLOBULIN (HUMAN) 50 GRAM(S): 10 INJECTION INTRAVENOUS; SUBCUTANEOUS at 13:00

## 2023-12-12 RX ADMIN — ENOXAPARIN SODIUM 40 MILLIGRAM(S): 100 INJECTION SUBCUTANEOUS at 11:23

## 2023-12-12 NOTE — PROGRESS NOTE ADULT - ASSESSMENT
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 Reports current symptoms of b/l foot numbness/tingling are similar to her prior AIDP symptoms. Previous course of AIDP began with tingling/numbness in her feet with wornening weakness. No respiratory involvement per patient report. Denies recent illness including diarrheal illness. No recent vaccines. Not on any medications per her report. VSS and labs unrevealing, urine HCG- negative. Exam notable for impaired STM, abnormal plantar response b/l, c/o reduced sensation to LT/pinprick + paresthesias: feet, legs, fingertips. No ophthalmoplegia, ataxia, reflexes are largely intact.    Impression: Paresthesias/numbness affecting the bilateral hands/feet in a patient with previous diagnosis of AIDP (treated with plasma exchange) - r/o AIDP, r/o other demyelinating disease    Plan:  [x] Baseline NIF/VC: NIF: -50 cm H2O, VC 2.92 L  [] Labs: CBC, CMP, ganglioside Abs panel, paraneoplastic autoantibody panel, A1c, lipid panel, UA, UTox, ferritin, total iron with TIBC, ACE, vitamin B1/B6/B12/D/E, folate, Lyme, WNV, copper, zinc, CK, ESR, CRP, TSH, T3/T4, RPR, RVP  [x] Imaging: MR C/T/L spine w/wo IV contrast - reviewed with neuroradiology 12/11/23, unremarkable  [] plan for lumbar puncture  [] will call Dr. Wong's office   [x] PT/OT  [x] Diet: Regular  [x] DVT prophylaxis: enoxaparin 40mg subcutaneous q24h; held for LP  [] when ready for discharge, outpatient f/u with her neurologist Dr. Tomasa Wong    Patient/plan seen and d/w Dr. Alaniz. To be seen by General Neurology attending in the AM with attestation to follow. Plan is not formalized until attending attestation is complete.     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 Reports current symptoms of b/l foot numbness/tingling are similar to her prior AIDP symptoms. Previous course of AIDP began with tingling/numbness in her feet with wornening weakness. No respiratory involvement per patient report. Denies recent illness including diarrheal illness. No recent vaccines. Not on any medications per her report. VSS and labs unrevealing, urine HCG- negative. Exam notable for impaired STM, abnormal plantar response b/l, c/o reduced sensation to LT/pinprick + paresthesias: feet, legs, fingertips. No ophthalmoplegia, ataxia, reflexes are largely intact.    Impression: Paresthesias/numbness affecting the bilateral hands/feet in a patient with previous diagnosis of AIDP (treated with plasma exchange) - r/o AIDP, r/o other demyelinating disease    Plan:  [x] Baseline NIF/VC: NIF: -50 cm H2O, VC 2.92 L  [] Labs: CBC, CMP, ganglioside Abs panel, paraneoplastic autoantibody panel, A1c, lipid panel, UA, UTox, ferritin, total iron with TIBC, ACE, vitamin B1/B6/B12/D/E, folate, Lyme, WNV, copper, zinc, CK, ESR, CRP, TSH, T3/T4, RPR, RVP  [x] Imaging: MR C/T/L spine w/wo IV contrast - reviewed with neuroradiology 12/11/23, unremarkable  [] plan for lumbar puncture  [] will call Dr. Wong's office   [x] PT/OT  [x] Diet: Regular  [x] DVT prophylaxis: enoxaparin 40mg subcutaneous q24h; held for LP  [] when ready for discharge, outpatient f/u with her neurologist Dr. Tomasa Wong    Patient/plan seen and d/w Dr. Alaniz. To be seen by General Neurology attending in the AM with attestation to follow. Plan is not formalized until attending attestation is complete.     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 Reports current symptoms of b/l foot numbness/tingling are similar to her prior AIDP symptoms. Previous course of AIDP began with tingling/numbness in her feet with worsening weakness. No respiratory involvement per patient report. Denies recent illness including diarrheal illness. No recent vaccines. Not on any medications per her report. VSS and labs unrevealing, urine HCG- negative. Exam notable for impaired STM, abnormal plantar response b/l, c/o reduced sensation to LT/pinprick + paresthesias: feet, legs, fingertips. No ophthalmoplegia, ataxia, reflexes are largely intact.    Impression: Paresthesias/numbness affecting the bilateral hands/feet similar to her prior episode of AIDP, possibly recrudescence of her prior AIDP symptoms vs. ?recurrent AIDP vs. other peripheral neuropathy    Plan:  [] Start IVIG 0.5g/kg daily for 4 days (12/12 - ), dose based on ideal body weight. Infuse over 6-7 hours. Common adverse side effects include headache, nausea, fever, tremor, flushing, myalgias, hypertension, tachycardia  - VS check 10 minutes after starting each dose, then q1h while IVIG infusion  - Premedicate with tylenol 650mg PO and benadryl 25mg PO approximately 30 minutes prior to each dose  - Draw IgA levels prior to first dose; IgA deficiency can cause increased risk of life-threatening allergic reactions, or anaphylaxis when receiving blood products  - Check daily CBC, CMP while on IVIG and monitor for symptoms of pulmonary edema, hemolysis, thrombosis, and anaphylaxis.  [x] Baseline NIF/VC: NIF: -50 cm H2O, VC 2.92 L  [] Labs: ganglioside Abs panel, paraneoplastic autoantibody panel, A1c, lipid panel, UA, UTox, ferritin, total iron with TIBC, ACE, vitamin B1/B6/B12/D/E, folate, Lyme, WNV, copper, zinc, CK, ESR, CRP, TSH, T3/T4, RPR, RVP  [x] Imaging: MR C/T/L spine w/wo IV contrast - reviewed with neuroradiology 12/11/23, unremarkable  [x] s/p LP 12/11/23   [] CSF 12/11/23: CC <1, prot 54, glu 59, clx neg, IgG 0.3 wnl, pending   [x] PT/OT  [x] Diet: Regular  [x] DVT prophylaxis: enoxaparin 40mg subcutaneous q24h   [] consider outpatient EMG  [] when ready for discharge, outpatient f/u with her neurologist Dr. Tomasa Wong    Patient/plan seen and d/w Dr. Alaniz.      Assessment	  Ileana Santana is a 43-year-old woman, with PMH significant for AIDP (dx age 17 while pregnant, tx PLEX, symptoms resolved) and abnormal Pap smear - treated with LEEP, who presents to [facility] ED on 12/8/2023, at the behest of her outpatient neurologist, with c/o b/l tingling and numbness in her feet and fingertips. Reports current symptoms of b/l foot numbness/tingling are similar to her prior AIDP symptoms. Previous course of AIDP began with tingling/numbness in her feet with worsening weakness. No respiratory involvement per patient report. Denies recent illness including diarrheal illness. No recent vaccines. Not on any medications per her report. VSS and labs unrevealing, urine HCG- negative. Exam notable for impaired STM, abnormal plantar response b/l, c/o reduced sensation to LT/pinprick + paresthesias: feet, legs, fingertips. No ophthalmoplegia, ataxia, reflexes are largely intact.    Impression: Paresthesias/numbness affecting the bilateral hands/feet similar to her prior episode of AIDP, possibly recrudescence of her prior AIDP symptoms vs. ?recurrent AIDP vs. other peripheral neuropathy    Plan:  [] Start IVIG 0.5g/kg daily for 4 days (12/12 - ), dose based on ideal body weight. Infuse over 6-7 hours. Common adverse side effects include headache, nausea, fever, tremor, flushing, myalgias, hypertension, tachycardia  - VS check 10 minutes after starting each dose, then q1h while IVIG infusion  - Premedicate with tylenol 650mg PO and benadryl 25mg PO approximately 30 minutes prior to each dose  - Draw IgA levels prior to first dose; IgA deficiency can cause increased risk of life-threatening allergic reactions, or anaphylaxis when receiving blood products  - Check daily CBC, CMP while on IVIG and monitor for symptoms of pulmonary edema, hemolysis, thrombosis, and anaphylaxis.  [x] Baseline NIF/VC: NIF: -50 cm H2O, VC 2.92 L  [] Labs: ganglioside Abs panel, paraneoplastic autoantibody panel, A1c, lipid panel, UA, UTox, ferritin, total iron with TIBC, ACE, vitamin B1/B6/B12/D/E, folate, Lyme, WNV, copper, zinc, CK, ESR, CRP, TSH, T3/T4, RPR, RVP  [x] Imaging: MR C/T/L spine w/wo IV contrast - reviewed with neuroradiology 12/11/23, unremarkable  [x] s/p LP 12/11/23   [] CSF 12/11/23: CC <1, prot 54, glu 59, clx neg, IgG 0.3 wnl, pending   [x] PT/OT  [x] Diet: Regular  [x] DVT prophylaxis: enoxaparin 40mg subcutaneous q24h   [] consider outpatient EMG  [] when ready for discharge, outpatient f/u with her neurologist Dr. Tomasa Wong    Patient/plan seen and d/w Dr. Alaniz.

## 2023-12-13 LAB
ALBUMIN SERPL ELPH-MCNC: 3.9 G/DL — SIGNIFICANT CHANGE UP (ref 3.3–5)
ALBUMIN SERPL ELPH-MCNC: 3.9 G/DL — SIGNIFICANT CHANGE UP (ref 3.3–5)
ALP SERPL-CCNC: 61 U/L — SIGNIFICANT CHANGE UP (ref 40–120)
ALP SERPL-CCNC: 61 U/L — SIGNIFICANT CHANGE UP (ref 40–120)
ALT FLD-CCNC: 36 U/L — SIGNIFICANT CHANGE UP (ref 10–45)
ALT FLD-CCNC: 36 U/L — SIGNIFICANT CHANGE UP (ref 10–45)
ANION GAP SERPL CALC-SCNC: 7 MMOL/L — SIGNIFICANT CHANGE UP (ref 5–17)
ANION GAP SERPL CALC-SCNC: 7 MMOL/L — SIGNIFICANT CHANGE UP (ref 5–17)
AST SERPL-CCNC: 31 U/L — SIGNIFICANT CHANGE UP (ref 10–40)
AST SERPL-CCNC: 31 U/L — SIGNIFICANT CHANGE UP (ref 10–40)
BILIRUB SERPL-MCNC: 0.4 MG/DL — SIGNIFICANT CHANGE UP (ref 0.2–1.2)
BILIRUB SERPL-MCNC: 0.4 MG/DL — SIGNIFICANT CHANGE UP (ref 0.2–1.2)
BUN SERPL-MCNC: 18 MG/DL — SIGNIFICANT CHANGE UP (ref 7–23)
BUN SERPL-MCNC: 18 MG/DL — SIGNIFICANT CHANGE UP (ref 7–23)
CALCIUM SERPL-MCNC: 9.1 MG/DL — SIGNIFICANT CHANGE UP (ref 8.4–10.5)
CALCIUM SERPL-MCNC: 9.1 MG/DL — SIGNIFICANT CHANGE UP (ref 8.4–10.5)
CHLORIDE SERPL-SCNC: 104 MMOL/L — SIGNIFICANT CHANGE UP (ref 96–108)
CHLORIDE SERPL-SCNC: 104 MMOL/L — SIGNIFICANT CHANGE UP (ref 96–108)
CO2 SERPL-SCNC: 24 MMOL/L — SIGNIFICANT CHANGE UP (ref 22–31)
CO2 SERPL-SCNC: 24 MMOL/L — SIGNIFICANT CHANGE UP (ref 22–31)
CREAT SERPL-MCNC: 0.9 MG/DL — SIGNIFICANT CHANGE UP (ref 0.5–1.3)
CREAT SERPL-MCNC: 0.9 MG/DL — SIGNIFICANT CHANGE UP (ref 0.5–1.3)
EGFR: 81 ML/MIN/1.73M2 — SIGNIFICANT CHANGE UP
EGFR: 81 ML/MIN/1.73M2 — SIGNIFICANT CHANGE UP
GLUCOSE SERPL-MCNC: 87 MG/DL — SIGNIFICANT CHANGE UP (ref 70–99)
GLUCOSE SERPL-MCNC: 87 MG/DL — SIGNIFICANT CHANGE UP (ref 70–99)
HCT VFR BLD CALC: 43.8 % — SIGNIFICANT CHANGE UP (ref 34.5–45)
HCT VFR BLD CALC: 43.8 % — SIGNIFICANT CHANGE UP (ref 34.5–45)
HGB BLD-MCNC: 14.5 G/DL — SIGNIFICANT CHANGE UP (ref 11.5–15.5)
HGB BLD-MCNC: 14.5 G/DL — SIGNIFICANT CHANGE UP (ref 11.5–15.5)
MCHC RBC-ENTMCNC: 29.2 PG — SIGNIFICANT CHANGE UP (ref 27–34)
MCHC RBC-ENTMCNC: 29.2 PG — SIGNIFICANT CHANGE UP (ref 27–34)
MCHC RBC-ENTMCNC: 33.1 GM/DL — SIGNIFICANT CHANGE UP (ref 32–36)
MCHC RBC-ENTMCNC: 33.1 GM/DL — SIGNIFICANT CHANGE UP (ref 32–36)
MCV RBC AUTO: 88.3 FL — SIGNIFICANT CHANGE UP (ref 80–100)
MCV RBC AUTO: 88.3 FL — SIGNIFICANT CHANGE UP (ref 80–100)
NRBC # BLD: 0 /100 WBCS — SIGNIFICANT CHANGE UP (ref 0–0)
NRBC # BLD: 0 /100 WBCS — SIGNIFICANT CHANGE UP (ref 0–0)
PLATELET # BLD AUTO: 218 K/UL — SIGNIFICANT CHANGE UP (ref 150–400)
PLATELET # BLD AUTO: 218 K/UL — SIGNIFICANT CHANGE UP (ref 150–400)
POTASSIUM SERPL-MCNC: 4.7 MMOL/L — SIGNIFICANT CHANGE UP (ref 3.5–5.3)
POTASSIUM SERPL-MCNC: 4.7 MMOL/L — SIGNIFICANT CHANGE UP (ref 3.5–5.3)
POTASSIUM SERPL-SCNC: 4.7 MMOL/L — SIGNIFICANT CHANGE UP (ref 3.5–5.3)
POTASSIUM SERPL-SCNC: 4.7 MMOL/L — SIGNIFICANT CHANGE UP (ref 3.5–5.3)
PROT SERPL-MCNC: 8 G/DL — SIGNIFICANT CHANGE UP (ref 6–8.3)
PROT SERPL-MCNC: 8 G/DL — SIGNIFICANT CHANGE UP (ref 6–8.3)
RBC # BLD: 4.96 M/UL — SIGNIFICANT CHANGE UP (ref 3.8–5.2)
RBC # BLD: 4.96 M/UL — SIGNIFICANT CHANGE UP (ref 3.8–5.2)
RBC # FLD: 13 % — SIGNIFICANT CHANGE UP (ref 10.3–14.5)
RBC # FLD: 13 % — SIGNIFICANT CHANGE UP (ref 10.3–14.5)
SODIUM SERPL-SCNC: 135 MMOL/L — SIGNIFICANT CHANGE UP (ref 135–145)
SODIUM SERPL-SCNC: 135 MMOL/L — SIGNIFICANT CHANGE UP (ref 135–145)
WBC # BLD: 3.56 K/UL — LOW (ref 3.8–10.5)
WBC # BLD: 3.56 K/UL — LOW (ref 3.8–10.5)
WBC # FLD AUTO: 3.56 K/UL — LOW (ref 3.8–10.5)
WBC # FLD AUTO: 3.56 K/UL — LOW (ref 3.8–10.5)
WNV IGG CSF IA-ACNC: POSITIVE
WNV IGG CSF IA-ACNC: POSITIVE
WNV IGM CSF IA-ACNC: NEGATIVE — SIGNIFICANT CHANGE UP
WNV IGM CSF IA-ACNC: NEGATIVE — SIGNIFICANT CHANGE UP

## 2023-12-13 PROCEDURE — 99232 SBSQ HOSP IP/OBS MODERATE 35: CPT

## 2023-12-13 RX ORDER — KETOROLAC TROMETHAMINE 30 MG/ML
15 SYRINGE (ML) INJECTION ONCE
Refills: 0 | Status: DISCONTINUED | OUTPATIENT
Start: 2023-12-13 | End: 2023-12-13

## 2023-12-13 RX ORDER — ACETAMINOPHEN 500 MG
650 TABLET ORAL ONCE
Refills: 0 | Status: COMPLETED | OUTPATIENT
Start: 2023-12-13 | End: 2023-12-13

## 2023-12-13 RX ADMIN — Medication 650 MILLIGRAM(S): at 11:08

## 2023-12-13 RX ADMIN — IMMUNE GLOBULIN (HUMAN) 50 GRAM(S): 10 INJECTION INTRAVENOUS; SUBCUTANEOUS at 11:46

## 2023-12-13 RX ADMIN — Medication 1 TABLET(S): at 05:03

## 2023-12-13 RX ADMIN — Medication 25 MILLIGRAM(S): at 11:02

## 2023-12-13 RX ADMIN — Medication 15 MILLIGRAM(S): at 21:01

## 2023-12-13 RX ADMIN — Medication 15 MILLIGRAM(S): at 22:31

## 2023-12-13 RX ADMIN — Medication 650 MILLIGRAM(S): at 11:02

## 2023-12-13 RX ADMIN — Medication 650 MILLIGRAM(S): at 18:56

## 2023-12-13 RX ADMIN — ENOXAPARIN SODIUM 40 MILLIGRAM(S): 100 INJECTION SUBCUTANEOUS at 11:02

## 2023-12-13 RX ADMIN — Medication 650 MILLIGRAM(S): at 19:30

## 2023-12-13 RX ADMIN — Medication 1 TABLET(S): at 17:17

## 2023-12-13 NOTE — PROGRESS NOTE ADULT - ASSESSMENT
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 Reports current symptoms of b/l foot numbness/tingling are similar to her prior AIDP symptoms. Previous course of AIDP began with tingling/numbness in her feet with worsening weakness. No respiratory involvement per patient report. Denies recent illness including diarrheal illness. No recent vaccines. Not on any medications per her report. VSS and labs unrevealing, urine HCG- negative. Exam notable for impaired STM, abnormal plantar response b/l, c/o reduced sensation to LT/pinprick + paresthesias: feet, legs, fingertips. No ophthalmoplegia, ataxia, reflexes are largely intact.    Impression: Paresthesias/numbness affecting the bilateral hands/feet similar to her prior episode of AIDP, possibly recrudescence of her prior AIDP symptoms vs. ?recurrent AIDP vs. other peripheral neuropathy    Plan:  [] Start IVIG 0.5g/kg daily for 4 days (12/12 - ), dose based on ideal body weight. Infuse over 6-7 hours. Common adverse side effects include headache, nausea, fever, tremor, flushing, myalgias, hypertension, tachycardia  - VS check 10 minutes after starting each dose, then q1h while IVIG infusion  - Premedicate with tylenol 650mg PO and benadryl 25mg PO approximately 30 minutes prior to each dose  - Draw IgA levels prior to first dose; IgA deficiency can cause increased risk of life-threatening allergic reactions, or anaphylaxis when receiving blood products  - Check daily CBC, CMP while on IVIG and monitor for symptoms of pulmonary edema, hemolysis, thrombosis, and anaphylaxis.  [x] Baseline NIF/VC: NIF: -50 cm H2O, VC 2.92 L  [] Labs: ganglioside Abs panel, paraneoplastic autoantibody panel, A1c, lipid panel, UA, UTox, ferritin, total iron with TIBC, ACE, vitamin B1/B6/B12/D/E, folate, Lyme, WNV, copper, zinc, CK, ESR, CRP, TSH, T3/T4, RPR, RVP  [x] Imaging: MR C/T/L spine w/wo IV contrast - reviewed with neuroradiology 12/11/23, unremarkable  [x] s/p LP 12/11/23   [] CSF 12/11/23: CC <1, prot 54, glu 59, clx neg, IgG 0.3 wnl, pending   [x] PT/OT  [x] Diet: Regular  [x] DVT prophylaxis: enoxaparin 40mg subcutaneous q24h   [] consider outpatient EMG  [] when ready for discharge, outpatient f/u with her neurologist Dr. Tomasa Wong    Patient/plan seen and d/w Dr. Alaniz.  Assessment	  Ileana Santana is a 43-year-old woman, with PMH significant for AIDP (dx age 17 while pregnant, tx PLEX, symptoms resolved) and abnormal Pap smear - treated with LEEP, who presents to [facility] ED on 12/8/2023, at the behest of her outpatient neurologist, with c/o b/l tingling and numbness in her feet and fingertips. Reports current symptoms of b/l foot numbness/tingling are similar to her prior AIDP symptoms. Previous course of AIDP began with tingling/numbness in her feet with worsening weakness. No respiratory involvement per patient report. Denies recent illness including diarrheal illness. No recent vaccines. Not on any medications per her report. VSS and labs unrevealing, urine HCG- negative. Exam notable for impaired STM, abnormal plantar response b/l, c/o reduced sensation to LT/pinprick + paresthesias: feet, legs, fingertips. No ophthalmoplegia, ataxia, reflexes are largely intact.    Impression: Paresthesias/numbness affecting the bilateral hands/feet similar to her prior episode of AIDP, possibly recrudescence of her prior AIDP symptoms vs. ?recurrent AIDP vs. other peripheral neuropathy    Plan:  [] Start IVIG 0.5g/kg daily for 4 days (12/12 - ), dose based on ideal body weight. Infuse over 6-7 hours. Common adverse side effects include headache, nausea, fever, tremor, flushing, myalgias, hypertension, tachycardia  - VS check 10 minutes after starting each dose, then q1h while IVIG infusion  - Premedicate with tylenol 650mg PO and benadryl 25mg PO approximately 30 minutes prior to each dose  - Draw IgA levels prior to first dose; IgA deficiency can cause increased risk of life-threatening allergic reactions, or anaphylaxis when receiving blood products  - Check daily CBC, CMP while on IVIG and monitor for symptoms of pulmonary edema, hemolysis, thrombosis, and anaphylaxis.  [x] Baseline NIF/VC: NIF: -50 cm H2O, VC 2.92 L  [] Labs: ganglioside Abs panel, paraneoplastic autoantibody panel, A1c, lipid panel, UA, UTox, ferritin, total iron with TIBC, ACE, vitamin B1/B6/B12/D/E, folate, Lyme, WNV, copper, zinc, CK, ESR, CRP, TSH, T3/T4, RPR, RVP  [x] Imaging: MR C/T/L spine w/wo IV contrast - reviewed with neuroradiology 12/11/23, unremarkable  [x] s/p LP 12/11/23   [] CSF 12/11/23: CC <1, prot 54, glu 59, clx neg, IgG 0.3 wnl, pending   [x] PT/OT  [x] Diet: Regular  [x] DVT prophylaxis: enoxaparin 40mg subcutaneous q24h   [] consider outpatient EMG  [] when ready for discharge, outpatient f/u with her neurologist Dr. Tomasa Wong    Patient/plan seen and d/w Dr. Alaniz.  Assessment	  Ileana Santana is a 43-year-old woman, with PMH significant for AIDP (dx age 17 while pregnant, tx PLEX, symptoms resolved) and abnormal Pap smear - treated with LEEP, who presents to [facility] ED on 12/8/2023, at the behest of her outpatient neurologist, with c/o b/l tingling and numbness in her feet and fingertips. Reports current symptoms of b/l foot numbness/tingling are similar to her prior AIDP symptoms. Previous course of AIDP began with tingling/numbness in her feet with worsening weakness. No respiratory involvement per patient report. Denies recent illness including diarrheal illness. No recent vaccines. Not on any medications per her report. VSS and labs unrevealing, urine HCG- negative. Exam notable for impaired STM, abnormal plantar response b/l, c/o reduced sensation to LT/pinprick + paresthesias: feet, legs, fingertips. No ophthalmoplegia, ataxia, reflexes are largely intact.    Impression: Paresthesias/numbness affecting the bilateral hands/feet similar to her prior episode of AIDP, possibly recrudescence of her prior AIDP symptoms vs. ?recurrent AIDP vs. other peripheral neuropathy    Plan:  [] Start IVIG 0.5g/kg daily for 4 days (12/12 - ), dose based on ideal body weight. Infuse over 6-7 hours. Common adverse side effects include headache, nausea, fever, tremor, flushing, myalgias, hypertension, tachycardia  - VS check 10 minutes after starting each dose, then q1h while IVIG infusion  - Premedicate with tylenol 650mg PO and benadryl 25mg PO approximately 30 minutes prior to each dose  - Draw IgA levels prior to first dose; IgA deficiency can cause increased risk of life-threatening allergic reactions, or anaphylaxis when receiving blood products  - Check daily CBC, CMP while on IVIG and monitor for symptoms of pulmonary edema, hemolysis, thrombosis, and anaphylaxis.  [x] Baseline NIF/VC: NIF: -50 cm H2O, VC 2.92 L  [] Labs: ganglioside Abs panel, paraneoplastic autoantibody panel, A1c, lipid panel, UA, UTox, ferritin, total iron with TIBC, ACE, vitamin B1/B6/B12/D/E, folate, Lyme, WNV, copper, zinc, CK, ESR, CRP, TSH, T3/T4, RPR, RVP  [x] Imaging: MR C/T/L spine w/wo IV contrast - reviewed with neuroradiology 12/11/23, unremarkable  [x] s/p LP 12/11/23   [] CSF 12/11/23: CC <1, prot 54, glu 59, clx neg, IgG 0.3 wnl, pending   [] bactrim 3 days for dysuria/UTI  [x] PT/OT  [x] Diet: Regular  [x] DVT prophylaxis: enoxaparin 40mg subcutaneous q24h   [] consider outpatient EMG  [] when ready for discharge, outpatient f/u with her neurologist Dr. Tomasa Wong    Patient/plan seen and d/w Dr. Alaniz.  Assessment	  Ileana Santana is a 43-year-old woman, with PMH significant for AIDP (dx age 17 while pregnant, tx PLEX, symptoms resolved) and abnormal Pap smear - treated with LEEP, who presents to [facility] ED on 12/8/2023, at the behest of her outpatient neurologist, with c/o b/l tingling and numbness in her feet and fingertips. Reports current symptoms of b/l foot numbness/tingling are similar to her prior AIDP symptoms. Previous course of AIDP began with tingling/numbness in her feet with worsening weakness. No respiratory involvement per patient report. Denies recent illness including diarrheal illness. No recent vaccines. Not on any medications per her report. VSS and labs unrevealing, urine HCG- negative. Exam notable for impaired STM, abnormal plantar response b/l, c/o reduced sensation to LT/pinprick + paresthesias: feet, legs, fingertips. No ophthalmoplegia, ataxia, reflexes are largely intact.    Impression: Paresthesias/numbness affecting the bilateral hands/feet similar to her prior episode of AIDP, possibly recrudescence of her prior AIDP symptoms vs. ?recurrent AIDP vs. other peripheral neuropathy    Plan:  [] Start IVIG 0.5g/kg daily for 4 days (12/12 - ), dose based on ideal body weight. Infuse over 6-7 hours. Common adverse side effects include headache, nausea, fever, tremor, flushing, myalgias, hypertension, tachycardia  - VS check 10 minutes after starting each dose, then q1h while IVIG infusion  - Premedicate with tylenol 650mg PO and benadryl 25mg PO approximately 30 minutes prior to each dose  - Draw IgA levels prior to first dose; IgA deficiency can cause increased risk of life-threatening allergic reactions, or anaphylaxis when receiving blood products  - Check daily CBC, CMP while on IVIG and monitor for symptoms of pulmonary edema, hemolysis, thrombosis, and anaphylaxis.  [x] Baseline NIF/VC: NIF: -50 cm H2O, VC 2.92 L  [] Labs: ganglioside Abs panel, paraneoplastic autoantibody panel, A1c, lipid panel, UA, UTox, ferritin, total iron with TIBC, ACE, vitamin B1/B6/B12/D/E, folate, Lyme, WNV, copper, zinc, CK, ESR, CRP, TSH, T3/T4, RPR, RVP  [x] Imaging: MR C/T/L spine w/wo IV contrast - reviewed with neuroradiology 12/11/23, unremarkable  [x] s/p LP 12/11/23   [] CSF 12/11/23: CC <1, prot 54, glu 59, clx neg, IgG 0.3 wnl, pending   [] bactrim 3 days for dysuria/UTI  [x] PT/OT  [x] Diet: Regular  [x] DVT prophylaxis: enoxaparin 40mg subcutaneous q24h   [] consider outpatient EMG  [] when ready for discharge, outpatient f/u with her neurologist Dr. Tomasa Wong    Patient/plan seen and d/w Dr. Alaniz.

## 2023-12-13 NOTE — PROGRESS NOTE ADULT - SUBJECTIVE AND OBJECTIVE BOX
SUBJECTIVE/INTERVAL HISTORY: No acute events overnight.    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 650 milliGRAM(s) Oral daily  diphenhydrAMINE 25 milliGRAM(s) Oral daily  enoxaparin Injectable 40 milliGRAM(s) SubCutaneous every 24 hours  immune   globulin 10% (GAMMAGARD) IVPB 30 Gram(s) IV Intermittent every 24 hours  influenza   Vaccine 0.5 milliLiter(s) IntraMuscular once  lidocaine 1% (Preservative-free) Injectable 10 milliLiter(s) Local Injection once  trimethoprim  160 mG/sulfamethoxazole 800 mG 1 Tablet(s) Oral two times a day    MEDICATIONS  (PRN):    ALLERGIES/INTOLERANCES:  Allergies  No Known Allergies    Intolerances    VITALS & EXAMINATION:  Vital Signs Last 24 Hrs  T(C): 36.6 (13 Dec 2023 04:59), Max: 37.4 (12 Dec 2023 12:55)  T(F): 97.9 (13 Dec 2023 04:59), Max: 99.3 (12 Dec 2023 12:55)  HR: 59 (13 Dec 2023 04:59) (55 - 72)  BP: 120/81 (13 Dec 2023 04:59) (108/74 - 137/83)  BP(mean): --  RR: 18 (13 Dec 2023 04:59) (16 - 18)  SpO2: 98% (13 Dec 2023 04:59) (96% - 99%)    Parameters below as of 13 Dec 2023 04:59  Patient On (Oxygen Delivery Method): room air      General:  Constitutional: Female, appears stated age, in no apparent distress including pain  Head: Normocephalic & atraumatic.  ENT: Neck supple, no lymphadenopathy.   Respiratory: No increased work of breathing at rest  Extremities: No cyanosis, clubbing, or edema.  Skin: No rashes, bruising, or discoloration.    Neurological (>12):  MS: Awake, alert, oriented to person, place, situation, time. Normal affect. Follows all commands.    Language: Speech is clear, fluent      CNs: PERRL (R = 4mm, L = 4mm). VFF. EOMI no nystagmus, no diplopia. V1-3 intact to LT. No facial asymmetry b/l. Tongue midline, normal movements, no atrophy.    Motor: Normal muscle bulk & tone. No noticeable tremor or seizure. No pronator drift.              Deltoid	Biceps	Triceps	Wrist	Finger ABd	   R	5	5	5	5	5		5 	  L	5	5	5	5	5		5    	H-Flex	K-Flex	K-Ext	D-Flex	P-Flex  R	5	5	5	5	5	   L	5	5	5	5	5		     Sensation: Intact to LT/PP/Position b/l throughout. However, pinprick described as feeling more "sensitive" when descending towards the feet, beginning mid-way at the shin. Sensation symmetric. Vibration and proprioception intact in toes.      Reflexes:              Biceps(C5)       BR(C6)     Triceps(C7)               Patellar(L4)    Achilles(S1)    Plantar Resp  R	2	          2	             2		        2		    0		mute  L	2	          2	             2		        2		    0		mute    Coordination: intact rapid-alt movements. No dysmetria to FTN     Gait: Not assessed.           LABORATORY:  CBC                       14.5   3.56  )-----------( 218      ( 13 Dec 2023 06:05 )             43.8     Chem 12-13    135  |  104  |  18  ----------------------------<  87  4.7   |  24  |  0.90    Ca    9.1      13 Dec 2023 06:03    TPro  8.0  /  Alb  3.9  /  TBili  0.4  /  DBili  x   /  AST  31  /  ALT  36  /  AlkPhos  61  12-13    LFTs LIVER FUNCTIONS - ( 13 Dec 2023 06:03 )  Alb: 3.9 g/dL / Pro: 8.0 g/dL / ALK PHOS: 61 U/L / ALT: 36 U/L / AST: 31 U/L / GGT: x           U/A Urinalysis Basic - ( 13 Dec 2023 06:03 )    Color: x / Appearance: x / SG: x / pH: x  Gluc: 87 mg/dL / Ketone: x  / Bili: x / Urobili: x   Blood: x / Protein: x / Nitrite: x   Leuk Esterase: x / RBC: x / WBC x   Sq Epi: x / Non Sq Epi: x / Bacteria: x        STUDIES & IMAGING:      MR Cervical Spine w/wo contrast:  Abnormal enhancement along multiple bilateral cervical nerve roots with   dorsal distribution, thin/non-masslike and most consistent with a   polyneuritis.    Distribution is dorsal/sensory and fits symptoms as probable relapse of   GBS-sensory variant, versus other inflammatory condition. Correlate with   CSF sampling.    No cord compression, abnormal signal or definitive enhancement to   indicate myelitis.    MR Thoracic Spine w/wo contrast:  No imaging evidence of Guillain [place] syndrome.    MR Lumbar Spine w/wo contrast:  No imaging evidence of Guillain [place] syndrome.  Left foraminal disc herniation at the L5-S1 level.   SUBJECTIVE/INTERVAL HISTORY: No acute events overnight.    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 650 milliGRAM(s) Oral daily  diphenhydrAMINE 25 milliGRAM(s) Oral daily  enoxaparin Injectable 40 milliGRAM(s) SubCutaneous every 24 hours  immune   globulin 10% (GAMMAGARD) IVPB 30 Gram(s) IV Intermittent every 24 hours  influenza   Vaccine 0.5 milliLiter(s) IntraMuscular once  lidocaine 1% (Preservative-free) Injectable 10 milliLiter(s) Local Injection once  trimethoprim  160 mG/sulfamethoxazole 800 mG 1 Tablet(s) Oral two times a day    MEDICATIONS  (PRN):    ALLERGIES/INTOLERANCES:  Allergies  No Known Allergies    Intolerances    VITALS & EXAMINATION:  Vital Signs Last 24 Hrs  T(C): 36.6 (13 Dec 2023 04:59), Max: 37.4 (12 Dec 2023 12:55)  T(F): 97.9 (13 Dec 2023 04:59), Max: 99.3 (12 Dec 2023 12:55)  HR: 59 (13 Dec 2023 04:59) (55 - 72)  BP: 120/81 (13 Dec 2023 04:59) (108/74 - 137/83)  BP(mean): --  RR: 18 (13 Dec 2023 04:59) (16 - 18)  SpO2: 98% (13 Dec 2023 04:59) (96% - 99%)    Parameters below as of 13 Dec 2023 04:59  Patient On (Oxygen Delivery Method): room air      General:  Constitutional: Female, appears stated age, in no apparent distress including pain  Head: Normocephalic & atraumatic.  ENT: Neck supple, no lymphadenopathy.   Respiratory: No increased work of breathing at rest  Extremities: No cyanosis, clubbing, or edema.  Skin: No rashes, bruising, or discoloration.    Neurological (>12):  MS: Awake, alert, oriented to person, place, situation, time. Normal affect. Follows all commands.    Language: Speech is clear, fluent      CNs: PERRL (R = 4mm, L = 4mm). VFF. EOMI no nystagmus, no diplopia. V1-3 intact to LT. No facial asymmetry b/l. Tongue midline, normal movements, no atrophy.    Motor: Normal muscle bulk & tone. No noticeable tremor or seizure. No pronator drift.              Deltoid	Biceps	Triceps	Wrist	Finger ABd	   R	5	5	5	5	5		5 	  L	5	5	5	5	5		5    	H-Flex	K-Flex	K-Ext	D-Flex	P-Flex  R	5	5	5	5	5	   L	5	5	5	5	5		     Sensation: Intact to LT/PP/Position b/l throughout. However, pinprick described as feeling more "sensitive" when descending towards the feet, beginning mid-way at the shin. Sensation symmetric. Vibration and proprioception intact in toes.      Reflexes:              Biceps(C5)       BR(C6)     Triceps(C7)               Patellar(L4)    Achilles(S1)    Plantar Resp  R	2	          2	             2		        2		    0		mute  L	2	          2	             2		        2		    0		mute    Coordination: intact rapid-alt movements. No dysmetria to FTN     Gait: Not assessed.           LABORATORY:  CBC                       14.5   3.56  )-----------( 218      ( 13 Dec 2023 06:05 )             43.8     Chem 12-13    135  |  104  |  18  ----------------------------<  87  4.7   |  24  |  0.90    Ca    9.1      13 Dec 2023 06:03    TPro  8.0  /  Alb  3.9  /  TBili  0.4  /  DBili  x   /  AST  31  /  ALT  36  /  AlkPhos  61  12-13    LFTs LIVER FUNCTIONS - ( 13 Dec 2023 06:03 )  Alb: 3.9 g/dL / Pro: 8.0 g/dL / ALK PHOS: 61 U/L / ALT: 36 U/L / AST: 31 U/L / GGT: x           U/A Urinalysis Basic - ( 13 Dec 2023 06:03 )    Color: x / Appearance: x / SG: x / pH: x  Gluc: 87 mg/dL / Ketone: x  / Bili: x / Urobili: x   Blood: x / Protein: x / Nitrite: x   Leuk Esterase: x / RBC: x / WBC x   Sq Epi: x / Non Sq Epi: x / Bacteria: x        STUDIES & IMAGING:      MR Cervical Spine w/wo contrast:  Abnormal enhancement along multiple bilateral cervical nerve roots with   dorsal distribution, thin/non-masslike and most consistent with a   polyneuritis.    Distribution is dorsal/sensory and fits symptoms as probable relapse of   GBS-sensory variant, versus other inflammatory condition. Correlate with   CSF sampling.    No cord compression, abnormal signal or definitive enhancement to   indicate myelitis.    MR Thoracic Spine w/wo contrast:  No imaging evidence of Guillain [place] syndrome.    MR Lumbar Spine w/wo contrast:  No imaging evidence of Guillain [place] syndrome.  Left foraminal disc herniation at the L5-S1 level.   SUBJECTIVE/INTERVAL HISTORY: No acute events overnight. Pt tolerated IVIG yesterday without any issues.    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 650 milliGRAM(s) Oral daily  diphenhydrAMINE 25 milliGRAM(s) Oral daily  enoxaparin Injectable 40 milliGRAM(s) SubCutaneous every 24 hours  immune   globulin 10% (GAMMAGARD) IVPB 30 Gram(s) IV Intermittent every 24 hours  influenza   Vaccine 0.5 milliLiter(s) IntraMuscular once  lidocaine 1% (Preservative-free) Injectable 10 milliLiter(s) Local Injection once  trimethoprim  160 mG/sulfamethoxazole 800 mG 1 Tablet(s) Oral two times a day    MEDICATIONS  (PRN):    ALLERGIES/INTOLERANCES:  Allergies  No Known Allergies    Intolerances    VITALS & EXAMINATION:  Vital Signs Last 24 Hrs  T(C): 36.6 (13 Dec 2023 04:59), Max: 37.4 (12 Dec 2023 12:55)  T(F): 97.9 (13 Dec 2023 04:59), Max: 99.3 (12 Dec 2023 12:55)  HR: 59 (13 Dec 2023 04:59) (55 - 72)  BP: 120/81 (13 Dec 2023 04:59) (108/74 - 137/83)  BP(mean): --  RR: 18 (13 Dec 2023 04:59) (16 - 18)  SpO2: 98% (13 Dec 2023 04:59) (96% - 99%)    Parameters below as of 13 Dec 2023 04:59  Patient On (Oxygen Delivery Method): room air      General:  Constitutional: Female, appears stated age, in no apparent distress including pain  Head: Normocephalic & atraumatic.  ENT: Neck supple, no lymphadenopathy.   Respiratory: No increased work of breathing at rest  Extremities: No cyanosis, clubbing, or edema.  Skin: No rashes, bruising, or discoloration.    Neurological (>12):  MS: Awake, alert, oriented to person, place, situation, time. Normal affect. Follows all commands.    Language: Speech is clear, fluent      CNs: PERRL (R = 4mm, L = 4mm). VFF. EOMI no nystagmus, no diplopia. V1-3 intact to LT. No facial asymmetry b/l. Tongue midline, normal movements, no atrophy.    Motor: Normal muscle bulk & tone. No noticeable tremor or seizure. No pronator drift.              Deltoid	Biceps	Triceps	Wrist	Finger ABd	   R	5	5	5	5	5		5 	  L	5	5	5	5	5		5    	H-Flex	K-Flex	K-Ext	D-Flex	P-Flex  R	5	5	5	5	5	   L	5	5	5	5	5		     Sensation: Intact to LT/PP/Position b/l throughout. However, pinprick described as feeling more "sensitive" when descending towards the feet, beginning mid-way at the shin. Sensation symmetric. Vibration and proprioception intact in toes.      Reflexes:              Biceps(C5)       BR(C6)     Triceps(C7)               Patellar(L4)    Achilles(S1)    Plantar Resp  R	2	          2	             2		        2		    0		mute  L	2	          2	             2		        2		    0		mute    Coordination: intact rapid-alt movements. No dysmetria to FTN     Gait: Not assessed.           LABORATORY:  CBC                       14.5   3.56  )-----------( 218      ( 13 Dec 2023 06:05 )             43.8     Chem 12-13    135  |  104  |  18  ----------------------------<  87  4.7   |  24  |  0.90    Ca    9.1      13 Dec 2023 06:03    TPro  8.0  /  Alb  3.9  /  TBili  0.4  /  DBili  x   /  AST  31  /  ALT  36  /  AlkPhos  61  12-13    LFTs LIVER FUNCTIONS - ( 13 Dec 2023 06:03 )  Alb: 3.9 g/dL / Pro: 8.0 g/dL / ALK PHOS: 61 U/L / ALT: 36 U/L / AST: 31 U/L / GGT: x           U/A Urinalysis Basic - ( 13 Dec 2023 06:03 )    Color: x / Appearance: x / SG: x / pH: x  Gluc: 87 mg/dL / Ketone: x  / Bili: x / Urobili: x   Blood: x / Protein: x / Nitrite: x   Leuk Esterase: x / RBC: x / WBC x   Sq Epi: x / Non Sq Epi: x / Bacteria: x        STUDIES & IMAGING:      MR Cervical Spine w/wo contrast:  Abnormal enhancement along multiple bilateral cervical nerve roots with   dorsal distribution, thin/non-masslike and most consistent with a   polyneuritis.    Distribution is dorsal/sensory and fits symptoms as probable relapse of   GBS-sensory variant, versus other inflammatory condition. Correlate with   CSF sampling.    No cord compression, abnormal signal or definitive enhancement to   indicate myelitis.    MR Thoracic Spine w/wo contrast:  No imaging evidence of Guillain [place] syndrome.    MR Lumbar Spine w/wo contrast:  No imaging evidence of Guillain [place] syndrome.  Left foraminal disc herniation at the L5-S1 level.   SUBJECTIVE/INTERVAL HISTORY: No acute events overnight. Pt tolerated IVIG yesterday without any issues.    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 650 milliGRAM(s) Oral daily  diphenhydrAMINE 25 milliGRAM(s) Oral daily  enoxaparin Injectable 40 milliGRAM(s) SubCutaneous every 24 hours  immune   globulin 10% (GAMMAGARD) IVPB 30 Gram(s) IV Intermittent every 24 hours  influenza   Vaccine 0.5 milliLiter(s) IntraMuscular once  lidocaine 1% (Preservative-free) Injectable 10 milliLiter(s) Local Injection once  trimethoprim  160 mG/sulfamethoxazole 800 mG 1 Tablet(s) Oral two times a day    MEDICATIONS  (PRN):    ALLERGIES/INTOLERANCES:  Allergies  No Known Allergies    Intolerances    VITALS & EXAMINATION:  Vital Signs Last 24 Hrs  T(C): 36.6 (13 Dec 2023 04:59), Max: 37.4 (12 Dec 2023 12:55)  T(F): 97.9 (13 Dec 2023 04:59), Max: 99.3 (12 Dec 2023 12:55)  HR: 59 (13 Dec 2023 04:59) (55 - 72)  BP: 120/81 (13 Dec 2023 04:59) (108/74 - 137/83)  BP(mean): --  RR: 18 (13 Dec 2023 04:59) (16 - 18)  SpO2: 98% (13 Dec 2023 04:59) (96% - 99%)    Parameters below as of 13 Dec 2023 04:59  Patient On (Oxygen Delivery Method): room air      General:  Constitutional: Female, appears stated age, in no apparent distress including pain  Head: Normocephalic & atraumatic.  ENT: Neck supple, no lymphadenopathy.   Respiratory: No increased work of breathing at rest  Extremities: No cyanosis, clubbing, or edema.  Skin: No rashes, bruising, or discoloration.    Neurological (>12):  MS: Awake, alert, oriented to person, place, situation, time. Normal affect. Follows all commands.    Language: Speech is clear, fluent      CNs: PERRL (R = 4mm, L = 4mm). VFF. EOMI no nystagmus, no diplopia. V1-3 intact to LT. No facial asymmetry b/l. Tongue midline, normal movements, no atrophy.    Motor: Normal muscle bulk & tone. No noticeable tremor or seizure. No pronator drift.              Deltoid	Biceps	Triceps	Wrist	Finger ABd	   R	5	5	5	5	5		5 	  L	5	5	5	5	5		5    	H-Flex	K-Flex	K-Ext	D-Flex	P-Flex  R	5	5	5	5	5	   L	5	5	5	5	5		     Sensation: Intact to LT/PP/Position b/l throughout. However, pinprick described as feeling more "sensitive" when descending towards the feet, beginning mid-way at the shin. Sensation symmetric. Vibration and proprioception intact in toes.      Reflexes:              Biceps(C5)       BR(C6)     Triceps(C7)               Patellar(L4)    Achilles(S1)    Plantar Resp  R	2	          2	             2		        2		    0		mute  L	2	          2	             2		        2		    0		mute    Coordination: intact rapid-alt movements. No dysmetria to FTN     Gait: Not assessed.           LABORATORY:  CBC                       14.5   3.56  )-----------( 218      ( 13 Dec 2023 06:05 )             43.8     Chem 12-13    135  |  104  |  18  ----------------------------<  87  4.7   |  24  |  0.90    Ca    9.1      13 Dec 2023 06:03    TPro  8.0  /  Alb  3.9  /  TBili  0.4  /  DBili  x   /  AST  31  /  ALT  36  /  AlkPhos  61  12-13    LFTs LIVER FUNCTIONS - ( 13 Dec 2023 06:03 )  Alb: 3.9 g/dL / Pro: 8.0 g/dL / ALK PHOS: 61 U/L / ALT: 36 U/L / AST: 31 U/L / GGT: x           U/A Urinalysis Basic - ( 13 Dec 2023 06:03 )    Color: x / Appearance: x / SG: x / pH: x  Gluc: 87 mg/dL / Ketone: x  / Bili: x / Urobili: x   Blood: x / Protein: x / Nitrite: x   Leuk Esterase: x / RBC: x / WBC x   Sq Epi: x / Non Sq Epi: x / Bacteria: x        STUDIES & IMAGING:      MR Cervical Spine w/wo contrast:  Abnormal enhancement along multiple bilateral cervical nerve roots with   dorsal distribution, thin/non-masslike and most consistent with a   polyneuritis.    Distribution is dorsal/sensory and fits symptoms as probable relapse of   GBS-sensory variant, versus other inflammatory condition. Correlate with   CSF sampling.    No cord compression, abnormal signal or definitive enhancement to   indicate myelitis.    MR Thoracic Spine w/wo contrast:  No imaging evidence of Guillain [place] syndrome.    MR Lumbar Spine w/wo contrast:  No imaging evidence of Guillain [place] syndrome.  Left foraminal disc herniation at the L5-S1 level.

## 2023-12-14 LAB
ALBUMIN SERPL ELPH-MCNC: 3.7 G/DL — SIGNIFICANT CHANGE UP (ref 3.3–5)
ALBUMIN SERPL ELPH-MCNC: 3.7 G/DL — SIGNIFICANT CHANGE UP (ref 3.3–5)
ALP SERPL-CCNC: 58 U/L — SIGNIFICANT CHANGE UP (ref 40–120)
ALP SERPL-CCNC: 58 U/L — SIGNIFICANT CHANGE UP (ref 40–120)
ALT FLD-CCNC: 37 U/L — SIGNIFICANT CHANGE UP (ref 10–45)
ALT FLD-CCNC: 37 U/L — SIGNIFICANT CHANGE UP (ref 10–45)
ANION GAP SERPL CALC-SCNC: 11 MMOL/L — SIGNIFICANT CHANGE UP (ref 5–17)
ANION GAP SERPL CALC-SCNC: 11 MMOL/L — SIGNIFICANT CHANGE UP (ref 5–17)
AST SERPL-CCNC: 35 U/L — SIGNIFICANT CHANGE UP (ref 10–40)
AST SERPL-CCNC: 35 U/L — SIGNIFICANT CHANGE UP (ref 10–40)
B BURGDOR DNA SPEC QL NAA+PROBE: NEGATIVE — SIGNIFICANT CHANGE UP
B BURGDOR DNA SPEC QL NAA+PROBE: NEGATIVE — SIGNIFICANT CHANGE UP
BILIRUB SERPL-MCNC: 0.6 MG/DL — SIGNIFICANT CHANGE UP (ref 0.2–1.2)
BILIRUB SERPL-MCNC: 0.6 MG/DL — SIGNIFICANT CHANGE UP (ref 0.2–1.2)
BUN SERPL-MCNC: 16 MG/DL — SIGNIFICANT CHANGE UP (ref 7–23)
BUN SERPL-MCNC: 16 MG/DL — SIGNIFICANT CHANGE UP (ref 7–23)
CALCIUM SERPL-MCNC: 9 MG/DL — SIGNIFICANT CHANGE UP (ref 8.4–10.5)
CALCIUM SERPL-MCNC: 9 MG/DL — SIGNIFICANT CHANGE UP (ref 8.4–10.5)
CHLORIDE SERPL-SCNC: 105 MMOL/L — SIGNIFICANT CHANGE UP (ref 96–108)
CHLORIDE SERPL-SCNC: 105 MMOL/L — SIGNIFICANT CHANGE UP (ref 96–108)
CO2 SERPL-SCNC: 21 MMOL/L — LOW (ref 22–31)
CO2 SERPL-SCNC: 21 MMOL/L — LOW (ref 22–31)
CREAT SERPL-MCNC: 0.99 MG/DL — SIGNIFICANT CHANGE UP (ref 0.5–1.3)
CREAT SERPL-MCNC: 0.99 MG/DL — SIGNIFICANT CHANGE UP (ref 0.5–1.3)
EGFR: 72 ML/MIN/1.73M2 — SIGNIFICANT CHANGE UP
EGFR: 72 ML/MIN/1.73M2 — SIGNIFICANT CHANGE UP
GLUCOSE SERPL-MCNC: 92 MG/DL — SIGNIFICANT CHANGE UP (ref 70–99)
GLUCOSE SERPL-MCNC: 92 MG/DL — SIGNIFICANT CHANGE UP (ref 70–99)
HCT VFR BLD CALC: 39.7 % — SIGNIFICANT CHANGE UP (ref 34.5–45)
HCT VFR BLD CALC: 39.7 % — SIGNIFICANT CHANGE UP (ref 34.5–45)
HGB BLD-MCNC: 13 G/DL — SIGNIFICANT CHANGE UP (ref 11.5–15.5)
HGB BLD-MCNC: 13 G/DL — SIGNIFICANT CHANGE UP (ref 11.5–15.5)
LYME IGG LINE BLOT INTERP.: NEGATIVE — SIGNIFICANT CHANGE UP
LYME IGG LINE BLOT INTERP.: NEGATIVE — SIGNIFICANT CHANGE UP
LYME IGM LINE BLOT INTERP.: NEGATIVE — SIGNIFICANT CHANGE UP
LYME IGM LINE BLOT INTERP.: NEGATIVE — SIGNIFICANT CHANGE UP
MAGNESIUM SERPL-MCNC: 2.2 MG/DL — SIGNIFICANT CHANGE UP (ref 1.6–2.6)
MAGNESIUM SERPL-MCNC: 2.2 MG/DL — SIGNIFICANT CHANGE UP (ref 1.6–2.6)
MCHC RBC-ENTMCNC: 29.1 PG — SIGNIFICANT CHANGE UP (ref 27–34)
MCHC RBC-ENTMCNC: 29.1 PG — SIGNIFICANT CHANGE UP (ref 27–34)
MCHC RBC-ENTMCNC: 32.7 GM/DL — SIGNIFICANT CHANGE UP (ref 32–36)
MCHC RBC-ENTMCNC: 32.7 GM/DL — SIGNIFICANT CHANGE UP (ref 32–36)
MCV RBC AUTO: 89 FL — SIGNIFICANT CHANGE UP (ref 80–100)
MCV RBC AUTO: 89 FL — SIGNIFICANT CHANGE UP (ref 80–100)
NRBC # BLD: 0 /100 WBCS — SIGNIFICANT CHANGE UP (ref 0–0)
NRBC # BLD: 0 /100 WBCS — SIGNIFICANT CHANGE UP (ref 0–0)
P18 AB. IGG: SIGNIFICANT CHANGE UP
P18 AB. IGG: SIGNIFICANT CHANGE UP
P23 AB. IGG: SIGNIFICANT CHANGE UP
P23 AB. IGG: SIGNIFICANT CHANGE UP
P23 AB. IGM: SIGNIFICANT CHANGE UP
P23 AB. IGM: SIGNIFICANT CHANGE UP
P28 AB. IGG: SIGNIFICANT CHANGE UP
P28 AB. IGG: SIGNIFICANT CHANGE UP
P30 AB. IGG: SIGNIFICANT CHANGE UP
P30 AB. IGG: SIGNIFICANT CHANGE UP
P39 AB. IGG: SIGNIFICANT CHANGE UP
P39 AB. IGG: SIGNIFICANT CHANGE UP
P39 AB. IGM: SIGNIFICANT CHANGE UP
P39 AB. IGM: SIGNIFICANT CHANGE UP
P41 AB. IGG: SIGNIFICANT CHANGE UP
P41 AB. IGG: SIGNIFICANT CHANGE UP
P41 AB. IGM: SIGNIFICANT CHANGE UP
P41 AB. IGM: SIGNIFICANT CHANGE UP
P45 AB. IGG: SIGNIFICANT CHANGE UP
P45 AB. IGG: SIGNIFICANT CHANGE UP
P58 AB. IGG: SIGNIFICANT CHANGE UP
P58 AB. IGG: SIGNIFICANT CHANGE UP
P66 AB. IGG: SIGNIFICANT CHANGE UP
P66 AB. IGG: SIGNIFICANT CHANGE UP
P93 AB. IGG: SIGNIFICANT CHANGE UP
P93 AB. IGG: SIGNIFICANT CHANGE UP
PHOSPHATE SERPL-MCNC: 3.7 MG/DL — SIGNIFICANT CHANGE UP (ref 2.5–4.5)
PHOSPHATE SERPL-MCNC: 3.7 MG/DL — SIGNIFICANT CHANGE UP (ref 2.5–4.5)
PLATELET # BLD AUTO: 184 K/UL — SIGNIFICANT CHANGE UP (ref 150–400)
PLATELET # BLD AUTO: 184 K/UL — SIGNIFICANT CHANGE UP (ref 150–400)
POTASSIUM SERPL-MCNC: 4.5 MMOL/L — SIGNIFICANT CHANGE UP (ref 3.5–5.3)
POTASSIUM SERPL-MCNC: 4.5 MMOL/L — SIGNIFICANT CHANGE UP (ref 3.5–5.3)
POTASSIUM SERPL-SCNC: 4.5 MMOL/L — SIGNIFICANT CHANGE UP (ref 3.5–5.3)
POTASSIUM SERPL-SCNC: 4.5 MMOL/L — SIGNIFICANT CHANGE UP (ref 3.5–5.3)
PROT SERPL-MCNC: 8.4 G/DL — HIGH (ref 6–8.3)
PROT SERPL-MCNC: 8.4 G/DL — HIGH (ref 6–8.3)
RBC # BLD: 4.46 M/UL — SIGNIFICANT CHANGE UP (ref 3.8–5.2)
RBC # BLD: 4.46 M/UL — SIGNIFICANT CHANGE UP (ref 3.8–5.2)
RBC # FLD: 13 % — SIGNIFICANT CHANGE UP (ref 10.3–14.5)
RBC # FLD: 13 % — SIGNIFICANT CHANGE UP (ref 10.3–14.5)
SODIUM SERPL-SCNC: 137 MMOL/L — SIGNIFICANT CHANGE UP (ref 135–145)
SODIUM SERPL-SCNC: 137 MMOL/L — SIGNIFICANT CHANGE UP (ref 135–145)
WBC # BLD: 4.84 K/UL — SIGNIFICANT CHANGE UP (ref 3.8–10.5)
WBC # BLD: 4.84 K/UL — SIGNIFICANT CHANGE UP (ref 3.8–10.5)
WBC # FLD AUTO: 4.84 K/UL — SIGNIFICANT CHANGE UP (ref 3.8–10.5)
WBC # FLD AUTO: 4.84 K/UL — SIGNIFICANT CHANGE UP (ref 3.8–10.5)

## 2023-12-14 PROCEDURE — 99232 SBSQ HOSP IP/OBS MODERATE 35: CPT

## 2023-12-14 RX ORDER — KETOROLAC TROMETHAMINE 30 MG/ML
30 SYRINGE (ML) INJECTION ONCE
Refills: 0 | Status: DISCONTINUED | OUTPATIENT
Start: 2023-12-14 | End: 2023-12-15

## 2023-12-14 RX ORDER — IBUPROFEN 200 MG
600 TABLET ORAL THREE TIMES A DAY
Refills: 0 | Status: DISCONTINUED | OUTPATIENT
Start: 2023-12-14 | End: 2023-12-15

## 2023-12-14 RX ADMIN — Medication 1 TABLET(S): at 05:27

## 2023-12-14 RX ADMIN — Medication 600 MILLIGRAM(S): at 18:29

## 2023-12-14 RX ADMIN — Medication 25 MILLIGRAM(S): at 10:53

## 2023-12-14 RX ADMIN — ENOXAPARIN SODIUM 40 MILLIGRAM(S): 100 INJECTION SUBCUTANEOUS at 10:59

## 2023-12-14 RX ADMIN — IMMUNE GLOBULIN (HUMAN) 50 GRAM(S): 10 INJECTION INTRAVENOUS; SUBCUTANEOUS at 11:46

## 2023-12-14 RX ADMIN — Medication 600 MILLIGRAM(S): at 11:18

## 2023-12-14 RX ADMIN — Medication 600 MILLIGRAM(S): at 12:00

## 2023-12-14 RX ADMIN — Medication 1 TABLET(S): at 18:29

## 2023-12-14 RX ADMIN — Medication 650 MILLIGRAM(S): at 10:53

## 2023-12-14 NOTE — PROGRESS NOTE ADULT - SUBJECTIVE AND OBJECTIVE BOX
SUBJECTIVE/INTERVAL HISTORY: No acute events overnight.    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 650 milliGRAM(s) Oral daily  diphenhydrAMINE 25 milliGRAM(s) Oral daily  enoxaparin Injectable 40 milliGRAM(s) SubCutaneous every 24 hours  immune   globulin 10% (GAMMAGARD) IVPB 30 Gram(s) IV Intermittent every 24 hours  influenza   Vaccine 0.5 milliLiter(s) IntraMuscular once  lidocaine 1% (Preservative-free) Injectable 10 milliLiter(s) Local Injection once  trimethoprim  160 mG/sulfamethoxazole 800 mG 1 Tablet(s) Oral two times a day    MEDICATIONS  (PRN):    ALLERGIES/INTOLERANCES:  Allergies  No Known Allergies    Intolerances    VITALS & EXAMINATION:  Vital Signs Last 24 Hrs  T(C): 36.9 (14 Dec 2023 05:30), Max: 37.4 (13 Dec 2023 20:59)  T(F): 98.4 (14 Dec 2023 05:30), Max: 99.4 (13 Dec 2023 20:59)  HR: 65 (14 Dec 2023 05:30) (62 - 87)  BP: 109/73 (14 Dec 2023 05:30) (100/64 - 130/84)  BP(mean): 93 (13 Dec 2023 12:00) (93 - 101)  RR: 18 (14 Dec 2023 05:30) (18 - 18)  SpO2: 98% (14 Dec 2023 05:30) (96% - 100%)    Parameters below as of 14 Dec 2023 05:30  Patient On (Oxygen Delivery Method): room air         LABORATORY:  CBC                       13.0   4.84  )-----------( 184      ( 14 Dec 2023 06:03 )             39.7     Chem 12-14    137  |  105  |  16  ----------------------------<  92  4.5   |  21<L>  |  0.99    Ca    9.0      14 Dec 2023 06:03  Phos  3.7     12-14  Mg     2.2     12-14    TPro  8.4<H>  /  Alb  3.7  /  TBili  0.6  /  DBili  x   /  AST  35  /  ALT  37  /  AlkPhos  58  12-14    LFTs LIVER FUNCTIONS - ( 14 Dec 2023 06:03 )  Alb: 3.7 g/dL / Pro: 8.4 g/dL / ALK PHOS: 58 U/L / ALT: 37 U/L / AST: 35 U/L / GGT: x           Coagulopathy   Lipid Panel   A1c   Cardiac enzymes     U/A Urinalysis Basic - ( 14 Dec 2023 06:03 )    Color: x / Appearance: x / SG: x / pH: x  Gluc: 92 mg/dL / Ketone: x  / Bili: x / Urobili: x   Blood: x / Protein: x / Nitrite: x   Leuk Esterase: x / RBC: x / WBC x   Sq Epi: x / Non Sq Epi: x / Bacteria: x      STUDIES & IMAGING:     SUBJECTIVE/INTERVAL HISTORY: No acute events overnight.    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 650 milliGRAM(s) Oral daily  diphenhydrAMINE 25 milliGRAM(s) Oral daily  enoxaparin Injectable 40 milliGRAM(s) SubCutaneous every 24 hours  immune   globulin 10% (GAMMAGARD) IVPB 30 Gram(s) IV Intermittent every 24 hours  influenza   Vaccine 0.5 milliLiter(s) IntraMuscular once  lidocaine 1% (Preservative-free) Injectable 10 milliLiter(s) Local Injection once  trimethoprim  160 mG/sulfamethoxazole 800 mG 1 Tablet(s) Oral two times a day    MEDICATIONS  (PRN):    ALLERGIES/INTOLERANCES:  Allergies  No Known Allergies    Intolerances    VITALS & EXAMINATION:  Vital Signs Last 24 Hrs  T(C): 36.9 (14 Dec 2023 05:30), Max: 37.4 (13 Dec 2023 20:59)  T(F): 98.4 (14 Dec 2023 05:30), Max: 99.4 (13 Dec 2023 20:59)  HR: 65 (14 Dec 2023 05:30) (62 - 87)  BP: 109/73 (14 Dec 2023 05:30) (100/64 - 130/84)  BP(mean): 93 (13 Dec 2023 12:00) (93 - 101)  RR: 18 (14 Dec 2023 05:30) (18 - 18)  SpO2: 98% (14 Dec 2023 05:30) (96% - 100%)    Parameters below as of 14 Dec 2023 05:30  Patient On (Oxygen Delivery Method): room air         LABORATORY:  CBC                       13.0   4.84  )-----------( 184      ( 14 Dec 2023 06:03 )             39.7     Chem 12-14    137  |  105  |  16  ----------------------------<  92  4.5   |  21<L>  |  0.99    Ca    9.0      14 Dec 2023 06:03  Phos  3.7     12-14  Mg     2.2     12-14    TPro  8.4<H>  /  Alb  3.7  /  TBili  0.6  /  DBili  x   /  AST  35  /  ALT  37  /  AlkPhos  58  12-14    LFTs LIVER FUNCTIONS - ( 14 Dec 2023 06:03 )  Alb: 3.7 g/dL / Pro: 8.4 g/dL / ALK PHOS: 58 U/L / ALT: 37 U/L / AST: 35 U/L / GGT: x           Coagulopathy   Lipid Panel   A1c   Cardiac enzymes     U/A Urinalysis Basic - ( 14 Dec 2023 06:03 )    Color: x / Appearance: x / SG: x / pH: x  Gluc: 92 mg/dL / Ketone: x  / Bili: x / Urobili: x   Blood: x / Protein: x / Nitrite: x   Leuk Esterase: x / RBC: x / WBC x   Sq Epi: x / Non Sq Epi: x / Bacteria: x      STUDIES & IMAGING:     SUBJECTIVE/INTERVAL HISTORY: No acute events overnight. Pt reports a headache since last night with some posterior neck pain, slightly worse with sitting up but not consistently positional and with photophobia. 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 650 milliGRAM(s) Oral daily  diphenhydrAMINE 25 milliGRAM(s) Oral daily  enoxaparin Injectable 40 milliGRAM(s) SubCutaneous every 24 hours  immune   globulin 10% (GAMMAGARD) IVPB 30 Gram(s) IV Intermittent every 24 hours  influenza   Vaccine 0.5 milliLiter(s) IntraMuscular once  lidocaine 1% (Preservative-free) Injectable 10 milliLiter(s) Local Injection once  trimethoprim  160 mG/sulfamethoxazole 800 mG 1 Tablet(s) Oral two times a day    MEDICATIONS  (PRN):    ALLERGIES/INTOLERANCES:  Allergies  No Known Allergies    Intolerances    VITALS & EXAMINATION:  Vital Signs Last 24 Hrs  T(C): 36.9 (14 Dec 2023 05:30), Max: 37.4 (13 Dec 2023 20:59)  T(F): 98.4 (14 Dec 2023 05:30), Max: 99.4 (13 Dec 2023 20:59)  HR: 65 (14 Dec 2023 05:30) (62 - 87)  BP: 109/73 (14 Dec 2023 05:30) (100/64 - 130/84)  BP(mean): 93 (13 Dec 2023 12:00) (93 - 101)  RR: 18 (14 Dec 2023 05:30) (18 - 18)  SpO2: 98% (14 Dec 2023 05:30) (96% - 100%)    Parameters below as of 14 Dec 2023 05:30  Patient On (Oxygen Delivery Method): room air    General:  Constitutional: Female, appears stated age, in no apparent distress including pain  Head: Normocephalic & atraumatic.  Respiratory: No increased work of breathing at rest  Extremities: No cyanosis, clubbing, or edema.  Skin: No rashes, bruising, or discoloration.    Neurological (>12):  MS: Awake, alert, oriented to person, place, situation, time. Normal affect. Follows all commands.    Language: Speech is clear, fluent      CNs: PERRL (R = 4mm, L = 4mm). VFF. EOMI no nystagmus, no diplopia. V1-3 intact to LT. No facial asymmetry b/l. Tongue midline, normal movements, no atrophy.    Motor: Normal muscle bulk & tone. No noticeable tremor or seizure. No pronator drift.              Deltoid	Biceps	Triceps	Wrist	Finger ABd	   R	5	5	5	5	5		5 	  L	5	5	5	5	5		5    	H-Flex	K-Flex	K-Ext	D-Flex	P-Flex  R	5	5	5	5	5	   L	5	5	5	5	5		     Sensation: Intact to LT    Reflexes:              Biceps(C5)       BR(C6)     Triceps(C7)               Patellar(L4)    Achilles(S1)    Plantar Resp  R	2	          2	             2		        1		    0		mute  L	2	          2	             2		        2		    0		mute    Coordination: intact rapid-alt movements. No dysmetria to FTN     Gait: Not assessed.     LABORATORY:  CBC                       13.0   4.84  )-----------( 184      ( 14 Dec 2023 06:03 )             39.7     Chem 12-14    137  |  105  |  16  ----------------------------<  92  4.5   |  21<L>  |  0.99    Ca    9.0      14 Dec 2023 06:03  Phos  3.7     12-14  Mg     2.2     12-14    TPro  8.4<H>  /  Alb  3.7  /  TBili  0.6  /  DBili  x   /  AST  35  /  ALT  37  /  AlkPhos  58  12-14    LFTs LIVER FUNCTIONS - ( 14 Dec 2023 06:03 )  Alb: 3.7 g/dL / Pro: 8.4 g/dL / ALK PHOS: 58 U/L / ALT: 37 U/L / AST: 35 U/L / GGT: x              U/A Urinalysis Basic - ( 14 Dec 2023 06:03 )    Color: x / Appearance: x / SG: x / pH: x  Gluc: 92 mg/dL / Ketone: x  / Bili: x / Urobili: x   Blood: x / Protein: x / Nitrite: x   Leuk Esterase: x / RBC: x / WBC x   Sq Epi: x / Non Sq Epi: x / Bacteria: x      STUDIES & IMAGING:    MR Cervical Spine w/wo contrast:  Abnormal enhancement along multiple bilateral cervical nerve roots with   dorsal distribution, thin/non-masslike and most consistent with a   polyneuritis.    Distribution is dorsal/sensory and fits symptoms as probable relapse of   GBS-sensory variant, versus other inflammatory condition. Correlate with   CSF sampling.    No cord compression, abnormal signal or definitive enhancement to   indicate myelitis.    MR Thoracic Spine w/wo contrast:  No imaging evidence of Guillain [place] syndrome.    MR Lumbar Spine w/wo contrast:  No imaging evidence of Guillain [place] syndrome.  Left foraminal disc herniation at the L5-S1 level.     SUBJECTIVE/INTERVAL HISTORY: No acute events overnight. Pt reports a headache since last night with some posterior neck pain, slightly worse with sitting up but not consistently positional and with photophobia. 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 650 milliGRAM(s) Oral daily  diphenhydrAMINE 25 milliGRAM(s) Oral daily  enoxaparin Injectable 40 milliGRAM(s) SubCutaneous every 24 hours  immune   globulin 10% (GAMMAGARD) IVPB 30 Gram(s) IV Intermittent every 24 hours  influenza   Vaccine 0.5 milliLiter(s) IntraMuscular once  lidocaine 1% (Preservative-free) Injectable 10 milliLiter(s) Local Injection once  trimethoprim  160 mG/sulfamethoxazole 800 mG 1 Tablet(s) Oral two times a day    MEDICATIONS  (PRN):    ALLERGIES/INTOLERANCES:  Allergies  No Known Allergies    Intolerances    VITALS & EXAMINATION:  Vital Signs Last 24 Hrs  T(C): 36.9 (14 Dec 2023 05:30), Max: 37.4 (13 Dec 2023 20:59)  T(F): 98.4 (14 Dec 2023 05:30), Max: 99.4 (13 Dec 2023 20:59)  HR: 65 (14 Dec 2023 05:30) (62 - 87)  BP: 109/73 (14 Dec 2023 05:30) (100/64 - 130/84)  BP(mean): 93 (13 Dec 2023 12:00) (93 - 101)  RR: 18 (14 Dec 2023 05:30) (18 - 18)  SpO2: 98% (14 Dec 2023 05:30) (96% - 100%)    Parameters below as of 14 Dec 2023 05:30  Patient On (Oxygen Delivery Method): room air    General:  Constitutional: Female, appears stated age, in no apparent distress including pain  Head: Normocephalic & atraumatic.  Respiratory: No increased work of breathing at rest  Extremities: No cyanosis, clubbing, or edema.  Skin: No rashes, bruising, or discoloration.    Neurological (>12):  MS: Awake, alert, oriented to person, place, situation, time. Normal affect. Follows all commands.    Language: Speech is clear, fluent      CNs: PERRL (R = 4mm, L = 4mm). VFF. EOMI no nystagmus, no diplopia. V1-3 intact to LT. No facial asymmetry b/l. Tongue midline, normal movements, no atrophy.    Motor: Normal muscle bulk & tone. No noticeable tremor or seizure. No pronator drift.              Deltoid	Biceps	Triceps	Wrist	Finger ABd	   R	5	5	5	5	5		5 	  L	5	5	5	5	5		5    	H-Flex	K-Flex	K-Ext	D-Flex	P-Flex  R	5	5	5	5	5	   L	5	5	5	5	5		     Sensation: Intact to LT    Reflexes:              Biceps(C5)       BR(C6)     Triceps(C7)               Patellar(L4)    Achilles(S1)    Plantar Resp  R	2	          2	             2		        1		    0		mute  L	2	          2	             2		        2		    0		mute    Coordination: intact rapid-alt movements. No dysmetria to FTN     Gait: Not assessed.     LABORATORY:  CBC                       13.0   4.84  )-----------( 184      ( 14 Dec 2023 06:03 )             39.7     Chem 12-14    137  |  105  |  16  ----------------------------<  92  4.5   |  21<L>  |  0.99    Ca    9.0      14 Dec 2023 06:03  Phos  3.7     12-14  Mg     2.2     12-14    TPro  8.4<H>  /  Alb  3.7  /  TBili  0.6  /  DBili  x   /  AST  35  /  ALT  37  /  AlkPhos  58  12-14    LFTs LIVER FUNCTIONS - ( 14 Dec 2023 06:03 )  Alb: 3.7 g/dL / Pro: 8.4 g/dL / ALK PHOS: 58 U/L / ALT: 37 U/L / AST: 35 U/L / GGT: x              U/A Urinalysis Basic - ( 14 Dec 2023 06:03 )    Color: x / Appearance: x / SG: x / pH: x  Gluc: 92 mg/dL / Ketone: x  / Bili: x / Urobili: x   Blood: x / Protein: x / Nitrite: x   Leuk Esterase: x / RBC: x / WBC x   Sq Epi: x / Non Sq Epi: x / Bacteria: x      STUDIES & IMAGING:    MR Cervical Spine w/wo contrast:  Abnormal enhancement along multiple bilateral cervical nerve roots with   dorsal distribution, thin/non-masslike and most consistent with a   polyneuritis.    Distribution is dorsal/sensory and fits symptoms as probable relapse of   GBS-sensory variant, versus other inflammatory condition. Correlate with   CSF sampling.    No cord compression, abnormal signal or definitive enhancement to   indicate myelitis.    MR Thoracic Spine w/wo contrast:  No imaging evidence of Guillain [place] syndrome.    MR Lumbar Spine w/wo contrast:  No imaging evidence of Guillain [place] syndrome.  Left foraminal disc herniation at the L5-S1 level.

## 2023-12-14 NOTE — PROGRESS NOTE ADULT - ASSESSMENT
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 Reports current symptoms of b/l foot numbness/tingling are similar to her prior AIDP symptoms. Previous course of AIDP began with tingling/numbness in her feet with worsening weakness. No respiratory involvement per patient report. Denies recent illness including diarrheal illness. No recent vaccines. Not on any medications per her report. VSS and labs unrevealing, urine HCG- negative. Exam notable for impaired STM, abnormal plantar response b/l, c/o reduced sensation to LT/pinprick + paresthesias: feet, legs, fingertips. No ophthalmoplegia, ataxia, reflexes are largely intact.    Impression: Paresthesias/numbness affecting the bilateral hands/feet similar to her prior episode of AIDP, possibly recrudescence of her prior AIDP symptoms vs. ?recurrent AIDP vs. other peripheral neuropathy    Plan:  [] Start IVIG 0.5g/kg daily for 4 days (12/12 - ), dose based on ideal body weight. Infuse over 6-7 hours. Common adverse side effects include headache, nausea, fever, tremor, flushing, myalgias, hypertension, tachycardia  - VS check 10 minutes after starting each dose, then q1h while IVIG infusion  - Premedicate with tylenol 650mg PO and benadryl 25mg PO approximately 30 minutes prior to each dose  - Draw IgA levels prior to first dose; IgA deficiency can cause increased risk of life-threatening allergic reactions, or anaphylaxis when receiving blood products  - Check daily CBC, CMP while on IVIG and monitor for symptoms of pulmonary edema, hemolysis, thrombosis, and anaphylaxis.  [x] Baseline NIF/VC: NIF: -50 cm H2O, VC 2.92 L  [] Labs: ganglioside Abs panel, paraneoplastic autoantibody panel, A1c, lipid panel, UA, UTox, ferritin, total iron with TIBC, ACE, vitamin B1/B6/B12/D/E, folate, Lyme, WNV, copper, zinc, CK, ESR, CRP, TSH, T3/T4, RPR, RVP  [x] Imaging: MR C/T/L spine w/wo IV contrast - reviewed with neuroradiology 12/11/23, unremarkable  [x] s/p LP 12/11/23   [] CSF 12/11/23: CC <1, prot 54, glu 59, clx neg, IgG 0.3 wnl, pending   [] bactrim 3 days for dysuria/UTI  [x] PT/OT  [x] Diet: Regular  [x] DVT prophylaxis: enoxaparin 40mg subcutaneous q24h   [] consider outpatient EMG  [] when ready for discharge, outpatient f/u with her neurologist Dr. Tomasa Wong    Patient/plan seen and d/w Dr. Alaniz.    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 Reports current symptoms of b/l foot numbness/tingling are similar to her prior AIDP symptoms. Previous course of AIDP began with tingling/numbness in her feet with worsening weakness. No respiratory involvement per patient report. Denies recent illness including diarrheal illness. No recent vaccines. Not on any medications per her report. VSS and labs unrevealing, urine HCG- negative. Exam notable for impaired STM, abnormal plantar response b/l, c/o reduced sensation to LT/pinprick + paresthesias: feet, legs, fingertips. No ophthalmoplegia, ataxia, reflexes are largely intact.    Impression: Paresthesias/numbness affecting the bilateral hands/feet similar to her prior episode of AIDP, possibly recrudescence of her prior AIDP symptoms vs. ?recurrent AIDP vs. other peripheral neuropathy    Plan:  [] Start IVIG 0.5g/kg daily for 4 days (12/12 - ), dose based on ideal body weight. Infuse over 6-7 hours. Common adverse side effects include headache, nausea, fever, tremor, flushing, myalgias, hypertension, tachycardia  - VS check 10 minutes after starting each dose, then q1h while IVIG infusion  - Premedicate with tylenol 650mg PO and benadryl 25mg PO approximately 30 minutes prior to each dose  - Draw IgA levels prior to first dose; IgA deficiency can cause increased risk of life-threatening allergic reactions, or anaphylaxis when receiving blood products  - Check daily CBC, CMP while on IVIG and monitor for symptoms of pulmonary edema, hemolysis, thrombosis, and anaphylaxis.  [x] Baseline NIF/VC: NIF: -50 cm H2O, VC 2.92 L  [] Labs: ganglioside Abs panel, paraneoplastic autoantibody panel, A1c, lipid panel, UA, UTox, ferritin, total iron with TIBC, ACE, vitamin B1/B6/B12/D/E, folate, Lyme, WNV, copper, zinc, CK, ESR, CRP, TSH, T3/T4, RPR, RVP  [x] Imaging: MR C/T/L spine w/wo IV contrast - reviewed with neuroradiology 12/11/23, unremarkable  [x] s/p LP 12/11/23   [] CSF 12/11/23: CC <1, prot 54, glu 59, clx neg, IgG 0.3 wnl, pending   [] bactrim 3 days for dysuria/UTI  [x] PT/OT  [x] Diet: Regular  [x] DVT prophylaxis: enoxaparin 40mg subcutaneous q24h   [] consider outpatient EMG  [] when ready for discharge, outpatient f/u with her neurologist Dr. Tomasa Wong    Patient/plan seen and d/w Dr. Alaniz.    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 Reports current symptoms of b/l foot numbness/tingling are similar to her prior AIDP symptoms. Previous course of AIDP began with tingling/numbness in her feet with worsening weakness. No respiratory involvement per patient report. Denies recent illness including diarrheal illness. No recent vaccines. Not on any medications per her report. VSS and labs unrevealing, urine HCG- negative. Exam notable for impaired STM, abnormal plantar response b/l, c/o reduced sensation to LT/pinprick + paresthesias: feet, legs, fingertips. No ophthalmoplegia, ataxia, reflexes are largely intact. Baseline NIF/VC: NIF: -50 cm H2O, VC 2.92 L    Impression: Paresthesias/numbness affecting the bilateral hands/feet similar to her prior episode of AIDP, possibly recrudescence of her prior AIDP symptoms vs. ?recurrent AIDP vs. other peripheral neuropathy    Plan:  [] ibuprofen/toradol PRN for headache, likely migraine  [] Start IVIG 0.5g/kg daily for 4 days (12/12 - ), dose based on ideal body weight. Infuse over 6-7 hours. Common adverse side effects include headache, nausea, fever, tremor, flushing, myalgias, hypertension, tachycardia  - VS check 10 minutes after starting each dose, then q1h while IVIG infusion  - Premedicate with tylenol 650mg PO and benadryl 25mg PO approximately 30 minutes prior to each dose  -IgA 249, prior to first dose; IgA deficiency can cause increased risk of life-threatening allergic reactions, or anaphylaxis when receiving blood products  - Check daily CBC, CMP while on IVIG and monitor for symptoms of pulmonary edema, hemolysis, thrombosis, and anaphylaxis.  [] Labs: ganglioside Abs panel, paraneoplastic autoantibody panel, A1c 5.6, UA with pyuria but no bacteria, UTox neg, ferritin, total iron with TIBC, ACE, vitamin B1/B6/B12/D/E, folate, Lyme, WNV, copper, zinc, CK, ESR, CRP, TSH, T3/T4, RPR, RVP  [x] kappa/lambra ratio 2.79 (high) but immunofixation and SPEP normal, possibly in setting of inflammation  [x] Imaging: MR C/T/L spine w/wo IV contrast - reviewed with neuroradiology 12/11/23, unremarkable  [x] s/p LP 12/11/23   [] CSF 12/11/23: CC <1, prot 54, glu 59, clx neg, IgG 0.3 wnl, pending   [] bactrim 3 days for dysuria/UTI  [x] PT/OT  [x] Diet: Regular  [x] DVT prophylaxis: enoxaparin 40mg subcutaneous q24h   [] consider outpatient EMG  [] when ready for discharge, outpatient f/u with her neurologist Dr. Tomasa Wong    Patient/plan seen and d/w Dr. Alaniz.    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 Reports current symptoms of b/l foot numbness/tingling are similar to her prior AIDP symptoms. Previous course of AIDP began with tingling/numbness in her feet with worsening weakness. No respiratory involvement per patient report. Denies recent illness including diarrheal illness. No recent vaccines. Not on any medications per her report. VSS and labs unrevealing, urine HCG- negative. Exam notable for impaired STM, abnormal plantar response b/l, c/o reduced sensation to LT/pinprick + paresthesias: feet, legs, fingertips. No ophthalmoplegia, ataxia, reflexes are largely intact. Baseline NIF/VC: NIF: -50 cm H2O, VC 2.92 L    Impression: Paresthesias/numbness affecting the bilateral hands/feet similar to her prior episode of AIDP, possibly recrudescence of her prior AIDP symptoms vs. ?recurrent AIDP vs. other peripheral neuropathy    Plan:  [] ibuprofen/toradol PRN for headache, likely migraine  [] Start IVIG 0.5g/kg daily for 4 days (12/12 - ), dose based on ideal body weight. Infuse over 6-7 hours. Common adverse side effects include headache, nausea, fever, tremor, flushing, myalgias, hypertension, tachycardia  - VS check 10 minutes after starting each dose, then q1h while IVIG infusion  - Premedicate with tylenol 650mg PO and benadryl 25mg PO approximately 30 minutes prior to each dose  -IgA 249, prior to first dose; IgA deficiency can cause increased risk of life-threatening allergic reactions, or anaphylaxis when receiving blood products  - Check daily CBC, CMP while on IVIG and monitor for symptoms of pulmonary edema, hemolysis, thrombosis, and anaphylaxis.  [] Labs: ganglioside Abs panel, paraneoplastic autoantibody panel, A1c 5.6, UA with pyuria but no bacteria, UTox neg, ferritin, total iron with TIBC, ACE, vitamin B1/B6/B12/D/E, folate, Lyme, WNV, copper, zinc, CK, ESR, CRP, TSH, T3/T4, RPR, RVP  [x] kappa/lambra ratio 2.79 (high) but immunofixation and SPEP normal, possibly in setting of inflammation  [x] Imaging: MR C/T/L spine w/wo IV contrast - reviewed with neuroradiology 12/11/23, unremarkable  [x] s/p LP 12/11/23   [] CSF 12/11/23: CC <1, prot 54, glu 59, clx neg, IgG 0.3 wnl, pending   [] bactrim 3 days for dysuria/UTI  [x] PT/OT  [x] Diet: Regular  [x] DVT prophylaxis: enoxaparin 40mg subcutaneous q24h   [] consider outpatient EMG  [] when ready for discharge, outpatient f/u with her neurologist Dr. Tomasa Wong    Patient/plan seen and d/w Dr. Alaniz.

## 2023-12-14 NOTE — CHART NOTE - NSCHARTNOTEFT_GEN_A_CORE
Serologic work up negative: KIESHA, dsDNA, SSA/SSB, ANCA and RF negative.   Started on IVIG per neurology.     Low c/f rheumatologic disease.   Rheumatology will sign off.     Discussed with Dr. Adela Aviles MD   PGY-4  Reachable on TEAMS  Pager [phone]  Rheumatology Fellow Serologic work up negative: KIESHA, dsDNA, SSA/SSB, ANCA and RF negative.   Started on IVIG per neurology.     Low c/f rheumatologic disease.   Rheumatology will sign off.     Discussed with Dr. Adela Aviles MD   PGY-4  Reachable on TEAMS  Pager [phone]  Rheumatology Fellow

## 2023-12-15 ENCOUNTER — TRANSCRIPTION ENCOUNTER (OUTPATIENT)
Age: 43
End: 2023-12-15

## 2023-12-15 VITALS
DIASTOLIC BLOOD PRESSURE: 82 MMHG | TEMPERATURE: 98 F | OXYGEN SATURATION: 97 % | HEART RATE: 72 BPM | RESPIRATION RATE: 18 BRPM | SYSTOLIC BLOOD PRESSURE: 117 MMHG

## 2023-12-15 PROCEDURE — 86617 LYME DISEASE ANTIBODY: CPT

## 2023-12-15 PROCEDURE — 86431 RHEUMATOID FACTOR QUANT: CPT

## 2023-12-15 PROCEDURE — 80307 DRUG TEST PRSMV CHEM ANLYZR: CPT

## 2023-12-15 PROCEDURE — 87476 LYME DIS DNA AMP PROBE: CPT

## 2023-12-15 PROCEDURE — 89051 BODY FLUID CELL COUNT: CPT

## 2023-12-15 PROCEDURE — 84155 ASSAY OF PROTEIN SERUM: CPT

## 2023-12-15 PROCEDURE — 84145 PROCALCITONIN (PCT): CPT

## 2023-12-15 PROCEDURE — 87637 SARSCOV2&INF A&B&RSV AMP PRB: CPT

## 2023-12-15 PROCEDURE — 82040 ASSAY OF SERUM ALBUMIN: CPT

## 2023-12-15 PROCEDURE — 36415 COLL VENOUS BLD VENIPUNCTURE: CPT

## 2023-12-15 PROCEDURE — 72156 MRI NECK SPINE W/O & W/DYE: CPT | Mod: MA

## 2023-12-15 PROCEDURE — 85652 RBC SED RATE AUTOMATED: CPT

## 2023-12-15 PROCEDURE — 82945 GLUCOSE OTHER FLUID: CPT

## 2023-12-15 PROCEDURE — 86334 IMMUNOFIX E-PHORESIS SERUM: CPT

## 2023-12-15 PROCEDURE — 80048 BASIC METABOLIC PNL TOTAL CA: CPT

## 2023-12-15 PROCEDURE — 83036 HEMOGLOBIN GLYCOSYLATED A1C: CPT

## 2023-12-15 PROCEDURE — 86036 ANCA SCREEN EACH ANTIBODY: CPT

## 2023-12-15 PROCEDURE — 84100 ASSAY OF PHOSPHORUS: CPT

## 2023-12-15 PROCEDURE — 83735 ASSAY OF MAGNESIUM: CPT

## 2023-12-15 PROCEDURE — 86038 ANTINUCLEAR ANTIBODIES: CPT

## 2023-12-15 PROCEDURE — 82042 OTHER SOURCE ALBUMIN QUAN EA: CPT

## 2023-12-15 PROCEDURE — 82784 ASSAY IGA/IGD/IGG/IGM EACH: CPT

## 2023-12-15 PROCEDURE — 97165 OT EVAL LOW COMPLEX 30 MIN: CPT

## 2023-12-15 PROCEDURE — 97161 PT EVAL LOW COMPLEX 20 MIN: CPT

## 2023-12-15 PROCEDURE — 85027 COMPLETE CBC AUTOMATED: CPT

## 2023-12-15 PROCEDURE — 81001 URINALYSIS AUTO W/SCOPE: CPT

## 2023-12-15 PROCEDURE — 84157 ASSAY OF PROTEIN OTHER: CPT

## 2023-12-15 PROCEDURE — 86789 WEST NILE VIRUS ANTIBODY: CPT

## 2023-12-15 PROCEDURE — 81025 URINE PREGNANCY TEST: CPT

## 2023-12-15 PROCEDURE — 86235 NUCLEAR ANTIGEN ANTIBODY: CPT

## 2023-12-15 PROCEDURE — 94150 VITAL CAPACITY TEST: CPT

## 2023-12-15 PROCEDURE — 86225 DNA ANTIBODY NATIVE: CPT

## 2023-12-15 PROCEDURE — 83916 OLIGOCLONAL BANDS: CPT

## 2023-12-15 PROCEDURE — 72157 MRI CHEST SPINE W/O & W/DYE: CPT | Mod: MA

## 2023-12-15 PROCEDURE — 85025 COMPLETE CBC W/AUTO DIFF WBC: CPT

## 2023-12-15 PROCEDURE — 84443 ASSAY THYROID STIM HORMONE: CPT

## 2023-12-15 PROCEDURE — 84165 PROTEIN E-PHORESIS SERUM: CPT

## 2023-12-15 PROCEDURE — 87070 CULTURE OTHR SPECIMN AEROBIC: CPT

## 2023-12-15 PROCEDURE — A9585: CPT

## 2023-12-15 PROCEDURE — 86140 C-REACTIVE PROTEIN: CPT

## 2023-12-15 PROCEDURE — 80053 COMPREHEN METABOLIC PANEL: CPT

## 2023-12-15 PROCEDURE — 87205 SMEAR GRAM STAIN: CPT

## 2023-12-15 PROCEDURE — 72158 MRI LUMBAR SPINE W/O & W/DYE: CPT | Mod: MA

## 2023-12-15 PROCEDURE — 99285 EMERGENCY DEPT VISIT HI MDM: CPT

## 2023-12-15 PROCEDURE — 86788 WEST NILE VIRUS AB IGM: CPT

## 2023-12-15 PROCEDURE — 87086 URINE CULTURE/COLONY COUNT: CPT

## 2023-12-15 RX ADMIN — Medication 1 TABLET(S): at 06:03

## 2023-12-15 RX ADMIN — Medication 650 MILLIGRAM(S): at 09:30

## 2023-12-15 RX ADMIN — Medication 600 MILLIGRAM(S): at 09:25

## 2023-12-15 RX ADMIN — IMMUNE GLOBULIN (HUMAN) 50 GRAM(S): 10 INJECTION INTRAVENOUS; SUBCUTANEOUS at 09:25

## 2023-12-15 RX ADMIN — Medication 25 MILLIGRAM(S): at 08:57

## 2023-12-15 RX ADMIN — Medication 650 MILLIGRAM(S): at 08:57

## 2023-12-15 RX ADMIN — Medication 600 MILLIGRAM(S): at 10:00

## 2023-12-15 NOTE — PROGRESS NOTE ADULT - REASON FOR ADMISSION
Paresthesias/Numbness

## 2023-12-15 NOTE — DISCHARGE NOTE NURSING/CASE MANAGEMENT/SOCIAL WORK - NSDCPEFALRISK_GEN_ALL_CORE
For information on Fall & Injury Prevention, visit: [email or web address] OR  [email or web address] OR  https://www.cdc.gov/steadi/patient.html For information on Fall & Injury Prevention, visit: [email or web address]-[email or web address] OR  [email or web address]://[email or web address]/[email or web address]-injury OR  https://www.cdc.gov/steadi/patient.html

## 2023-12-15 NOTE — DISCHARGE NOTE PROVIDER - NSDCCPCAREPLAN_GEN_ALL_CORE_FT
PRINCIPAL DISCHARGE DIAGNOSIS  Diagnosis: AIDP (acute inflammatory demyelinating polyneuropathy)  Assessment and Plan of Treatment: You came into the hospital with numbness/tingling of your hands and feet. You had a lumbar puncture for sampling of your spinal fluid which showed some high protein, possibly consistent with GBS. Your symptoms did not progress and you received 4 days of IVIG with no clear improvement. We think your symptoms may be related to recurrence of your old GBS symptoms or possibly another episode of GBS. You will need to follow up with a neurologist Dr. Daley for further evaluation.      SECONDARY DISCHARGE DIAGNOSES  Diagnosis: Headache, migraine  Assessment and Plan of Treatment: You had a headache with resolved with ibuprofen. We think this headache is similar to your prior headaches during your period. 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 You can continue to take excedrin as needed.    Diagnosis: Acute UTI  Assessment and Plan of Treatment: You had some burning with urination which resolved with 3 days of antibiotics while in the hospital.    Diagnosis: Free immunoglobulin light chain above reference range  Assessment and Plan of Treatment: You had an abnormal ratio of light chains when we checked your bloodwork however this may just be in the setting of inflammation. Please follow up with a hematologist outside of the hospital to recheck your bloodwork

## 2023-12-15 NOTE — DISCHARGE NOTE PROVIDER - CARE PROVIDER_API CALL
Abner Daley  Neurology  [address], [address]  [place] [address]  Phone: [phone]  Fax: [phone]  Follow Up Time:    Abner Daley  Neurology  [address], [address]  [place] [address]  Phone: [phone]  Fax: [phone]  Follow Up Time:    Abner Daley  Neurology  [address], [address]  [place] [address]  Phone: [phone]  Fax: [phone]  Follow Up Time:     Tomasa Wong  Neurology  [address], [address], [place] [address]  Phone: [phone]  Fax: [phone]  Established Patient  Follow Up Time:    Abner Daley  Neurology  [address], [address]  [place] [address]  Phone: [phone]  Fax: [phone]  Follow Up Time:     Tomasa Wong  Neurology  [address], [address] [address]  Phone: [phone]  Fax: [phone]  Established Patient  Follow Up Time:

## 2023-12-15 NOTE — DISCHARGE NOTE PROVIDER - PROVIDER TOKENS
PROVIDER:[TOKEN:[[id number]]] PROVIDER:[TOKEN:[[id number]]],PROVIDER:[TOKEN:[[id number]],ESTABLISHEDPATIENT:[T]] PROVIDER:[TOKEN:[[id number]]],PROVIDER:[TOKEN:[[id number]],ESTABLISHEDPATIENT:[T]]

## 2023-12-15 NOTE — PROGRESS NOTE ADULT - ASSESSMENT
Assessment	  Ileana Santana is a 43-year-old woman, with PMH significant for AIDP (dx age 17 while pregnant, tx PLEX, symptoms resolved) and abnormal Pap smear - treated with LEEP, who presents to [facility] ED on 12/8/2023, at the behest of her outpatient neurologist, with c/o b/l tingling and numbness in her feet and fingertips. Reports current symptoms of b/l foot numbness/tingling are similar to her prior AIDP symptoms. Previous course of AIDP began with tingling/numbness in her feet with worsening weakness. No respiratory involvement per patient report. Denies recent illness including diarrheal illness. No recent vaccines. Not on any medications per her report. VSS and labs unrevealing, urine HCG- negative. Exam notable for impaired STM, abnormal plantar response b/l, c/o reduced sensation to LT/pinprick + paresthesias: feet, legs, fingertips. No ophthalmoplegia, ataxia, reflexes are largely intact. Baseline NIF/VC: NIF: -50 cm H2O, VC 2.92 L    Impression: Paresthesias/numbness affecting the bilateral hands/feet similar to her prior episode of AIDP, possibly recrudescence of her prior AIDP symptoms vs. ?recurrent AIDP vs. other peripheral neuropathy    Plan:  [] ibuprofen/toradol PRN for headache, likely migraine  [] Start IVIG 0.5g/kg daily for 4 days (12/12 -12/15), dose based on ideal body weight. Infuse over 6-7 hours. Common adverse side effects include headache, nausea, fever, tremor, flushing, myalgias, hypertension, tachycardia  - VS check 10 minutes after starting each dose, then q1h while IVIG infusion  - Premedicate with tylenol 650mg PO and benadryl 25mg PO approximately 30 minutes prior to each dose  -IgA 249, prior to first dose; IgA deficiency can cause increased risk of life-threatening allergic reactions, or anaphylaxis when receiving blood products  - Check daily CBC, CMP while on IVIG and monitor for symptoms of pulmonary edema, hemolysis, thrombosis, and anaphylaxis.  [] Labs: ganglioside Abs panel, paraneoplastic autoantibody panel, A1c 5.6, UA with pyuria but no bacteria, UTox neg, ferritin, total iron with TIBC, ACE, vitamin B1/B6/B12/D/E, folate, Lyme, WNV, copper, zinc, CK, ESR, CRP, TSH, T3/T4, RPR, RVP  [x] rheumatologic workup: KIESHA, dsDNA, SSA/SSB, ANCA and RF negative  [x] kappa/lambra ratio 2.79 (high) but immunofixation and SPEP normal, possibly in setting of inflammation  [x] Imaging: MR C/T/L spine w/wo IV contrast - reviewed with neuroradiology 12/11/23, unremarkable  [x] s/p LP 12/11/23   [] CSF 12/11/23: CC <1, prot 54, glu 59, clx neg, IgG 0.3 wnl, IgG index 0.6, WNV IgG+/IgM-, lyme IgG-/IgM-  [x] bactrim 3 days for dysuria/UTI  [x] PT/OT  [x] Diet: Regular  [x] DVT prophylaxis: enoxaparin 40mg subcutaneous q24h   [] consider outpatient EMG  [] when ready for discharge, outpatient f/u with her neurologist Dr. Tomasa Wong and Dr. Nino Daley    Patient/plan seen and d/w Dr. Alaniz.    Assessment	  Ileana Santana is a 43-year-old woman, with PMH significant for AIDP (dx age 17 while pregnant, tx PLEX, symptoms resolved) and abnormal Pap smear - treated with LEEP, who presents to [facility] ED on 12/8/2023, at the behest of her outpatient neurologist, with c/o b/l tingling and numbness in her feet and fingertips. Reports current symptoms of b/l foot numbness/tingling are similar to her prior AIDP symptoms. Previous course of AIDP began with tingling/numbness in her feet with worsening weakness. No respiratory involvement per patient report. Denies recent illness including diarrheal illness. No recent vaccines. Not on any medications per her report. VSS and labs unrevealing, urine HCG- negative. Exam notable for impaired STM, abnormal plantar response b/l, c/o reduced sensation to LT/pinprick + paresthesias: feet, legs, fingertips. No ophthalmoplegia, ataxia, reflexes are largely intact. Baseline NIF/VC: NIF: -50 cm H2O, VC 2.92 L    Impression: Paresthesias/numbness affecting the bilateral hands/feet similar to her prior episode of AIDP, possibly recrudescence of her prior AIDP symptoms vs. ?recurrent AIDP vs. other peripheral neuropathy    Plan:  [] ibuprofen/toradol PRN for headache, likely migraine  [] Start IVIG 0.5g/kg daily for 4 days (12/12 -12/15), dose based on ideal body weight. Infuse over 6-7 hours. Common adverse side effects include headache, nausea, fever, tremor, flushing, myalgias, hypertension, tachycardia  - VS check 10 minutes after starting each dose, then q1h while IVIG infusion  - Premedicate with tylenol 650mg PO and benadryl 25mg PO approximately 30 minutes prior to each dose  -IgA 249, prior to first dose; IgA deficiency can cause increased risk of life-threatening allergic reactions, or anaphylaxis when receiving blood products  - Check daily CBC, CMP while on IVIG and monitor for symptoms of pulmonary edema, hemolysis, thrombosis, and anaphylaxis.  [] Labs: ganglioside Abs panel, paraneoplastic autoantibody panel, A1c 5.6, UA with pyuria but no bacteria, UTox neg, ferritin, total iron with TIBC, ACE, vitamin B1/B6/B12/D/E, folate, Lyme, WNV, copper, zinc, CK, ESR, CRP, TSH, T3/T4, RPR, RVP  [x] rheumatologic workup: KIESHA, dsDNA, SSA/SSB, ANCA and RF negative  [x] kappa/lambra ratio 2.79 (high) but immunofixation and SPEP normal, possibly in setting of inflammation  [x] Imaging: MR C/T/L spine w/wo IV contrast - reviewed with neuroradiology 12/11/23, unremarkable  [x] s/p LP 12/11/23   [] CSF 12/11/23: CC <1, prot 54, glu 59, clx neg, IgG 0.3 wnl, IgG index 0.6, WNV IgG+/IgM-, lyme IgG-/IgM-  [x] bactrim 3 days for dysuria/UTI  [x] PT/OT  [x] Diet: Regular  [x] DVT prophylaxis: enoxaparin 40mg subcutaneous q24h   [] consider outpatient EMG  [] when ready for discharge, outpatient f/u with her neurologist Dr. Tomasa Wong and Dr. Nino Daley    Patient/plan seen and d/w Dr. Alaniz.

## 2023-12-15 NOTE — PROGRESS NOTE ADULT - SUBJECTIVE AND OBJECTIVE BOX
SUBJECTIVE/INTERVAL HISTORY: No acute events overnight.    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 650 milliGRAM(s) Oral daily  diphenhydrAMINE 25 milliGRAM(s) Oral daily  enoxaparin Injectable 40 milliGRAM(s) SubCutaneous every 24 hours  immune   globulin 10% (GAMMAGARD) IVPB 30 Gram(s) IV Intermittent every 24 hours  influenza   Vaccine 0.5 milliLiter(s) IntraMuscular once  lidocaine 1% (Preservative-free) Injectable 10 milliLiter(s) Local Injection once  trimethoprim  160 mG/sulfamethoxazole 800 mG 1 Tablet(s) Oral two times a day    MEDICATIONS  (PRN):  ibuprofen  Tablet. 600 milliGRAM(s) Oral three times a day PRN Mild Pain (1 - 3), Moderate Pain (4 - 6), Severe Pain (7 - 10)  ketorolac   Injectable 30 milliGRAM(s) IV Push once PRN Severe Pain (7 - 10)    ALLERGIES/INTOLERANCES:  Allergies  No Known Allergies    Intolerances    VITALS & EXAMINATION:  Vital Signs Last 24 Hrs  T(C): 37 (15 Dec 2023 05:00), Max: 37.5 (14 Dec 2023 12:05)  T(F): 98.6 (15 Dec 2023 05:00), Max: 99.5 (14 Dec 2023 12:05)  HR: 59 (15 Dec 2023 05:00) (57 - 77)  BP: 110/72 (15 Dec 2023 05:00) (110/72 - 134/85)  BP(mean): --  RR: 18 (15 Dec 2023 05:00) (17 - 18)  SpO2: 98% (15 Dec 2023 05:00) (97% - 100%)    Parameters below as of 15 Dec 2023 05:00  Patient On (Oxygen Delivery Method): room air      General:  Constitutional: Female, appears stated age, in no apparent distress including pain  Head: Normocephalic & atraumatic.  Respiratory: No increased work of breathing at rest  Extremities: No cyanosis, clubbing, or edema.  Skin: No rashes, bruising, or discoloration.    Neurological (>12):  MS: Awake, alert, oriented to person, place, situation, time. Normal affect. Follows all commands.    Language: Speech is clear, fluent      CNs: PERRL (R = 4mm, L = 4mm). VFF. EOMI no nystagmus, no diplopia. V1-3 intact to LT. No facial asymmetry b/l. Tongue midline, normal movements, no atrophy.    Motor: Normal muscle bulk & tone. No noticeable tremor or seizure. No pronator drift.              Deltoid	Biceps	Triceps	Wrist	Finger ABd	   R	5	5	5	5	5		5 	  L	5	5	5	5	5		5    	H-Flex	K-Flex	K-Ext	D-Flex	P-Flex  R	5	5	5	5	5	   L	5	5	5	5	5		     Sensation: Intact to LT    Reflexes:              Biceps(C5)       BR(C6)     Triceps(C7)               Patellar(L4)    Achilles(S1)    Plantar Resp  R	2	          2	             2		        1		    0		mute  L	2	          2	             2		        2		    0		mute    Coordination: intact rapid-alt movements. No dysmetria to FTN     Gait: Not assessed.    LABORATORY:  CBC                       13.0   4.84  )-----------( 184      ( 14 Dec 2023 06:03 )             39.7     Chem 12-14    137  |  105  |  16  ----------------------------<  92  4.5   |  21<L>  |  0.99    Ca    9.0      14 Dec 2023 06:03  Phos  3.7     12-14  Mg     2.2     12-14    TPro  8.4<H>  /  Alb  3.7  /  TBili  0.6  /  DBili  x   /  AST  35  /  ALT  37  /  AlkPhos  58  12-14    LFTs LIVER FUNCTIONS - ( 14 Dec 2023 06:03 )  Alb: 3.7 g/dL / Pro: 8.4 g/dL / ALK PHOS: 58 U/L / ALT: 37 U/L / AST: 35 U/L / GGT: x           Coagulopathy   Lipid Panel   A1c   Cardiac enzymes     U/A Urinalysis Basic - ( 14 Dec 2023 06:03 )    Color: x / Appearance: x / SG: x / pH: x  Gluc: 92 mg/dL / Ketone: x  / Bili: x / Urobili: x   Blood: x / Protein: x / Nitrite: x   Leuk Esterase: x / RBC: x / WBC x   Sq Epi: x / Non Sq Epi: x / Bacteria: x        STUDIES & IMAGING:      MR Cervical Spine w/wo contrast:  Abnormal enhancement along multiple bilateral cervical nerve roots with   dorsal distribution, thin/non-masslike and most consistent with a   polyneuritis.    Distribution is dorsal/sensory and fits symptoms as probable relapse of   GBS-sensory variant, versus other inflammatory condition. Correlate with   CSF sampling.    No cord compression, abnormal signal or definitive enhancement to   indicate myelitis.    MR Thoracic Spine w/wo contrast:  No imaging evidence of Guillain [place] syndrome.    MR Lumbar Spine w/wo contrast:  No imaging evidence of Guillain [place] syndrome.  Left foraminal disc herniation at the L5-S1 level.     SUBJECTIVE/INTERVAL HISTORY: No acute events overnight.    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 650 milliGRAM(s) Oral daily  diphenhydrAMINE 25 milliGRAM(s) Oral daily  enoxaparin Injectable 40 milliGRAM(s) SubCutaneous every 24 hours  immune   globulin 10% (GAMMAGARD) IVPB 30 Gram(s) IV Intermittent every 24 hours  influenza   Vaccine 0.5 milliLiter(s) IntraMuscular once  lidocaine 1% (Preservative-free) Injectable 10 milliLiter(s) Local Injection once  trimethoprim  160 mG/sulfamethoxazole 800 mG 1 Tablet(s) Oral two times a day    MEDICATIONS  (PRN):  ibuprofen  Tablet. 600 milliGRAM(s) Oral three times a day PRN Mild Pain (1 - 3), Moderate Pain (4 - 6), Severe Pain (7 - 10)  ketorolac   Injectable 30 milliGRAM(s) IV Push once PRN Severe Pain (7 - 10)    ALLERGIES/INTOLERANCES:  Allergies  No Known Allergies    Intolerances    VITALS & EXAMINATION:  Vital Signs Last 24 Hrs  T(C): 37 (15 Dec 2023 05:00), Max: 37.5 (14 Dec 2023 12:05)  T(F): 98.6 (15 Dec 2023 05:00), Max: 99.5 (14 Dec 2023 12:05)  HR: 59 (15 Dec 2023 05:00) (57 - 77)  BP: 110/72 (15 Dec 2023 05:00) (110/72 - 134/85)  BP(mean): --  RR: 18 (15 Dec 2023 05:00) (17 - 18)  SpO2: 98% (15 Dec 2023 05:00) (97% - 100%)    Parameters below as of 15 Dec 2023 05:00  Patient On (Oxygen Delivery Method): room air      General:  Constitutional: Female, appears stated age, in no apparent distress including pain  Head: Normocephalic & atraumatic.  Respiratory: No increased work of breathing at rest  Extremities: No cyanosis, clubbing, or edema.  Skin: No rashes, bruising, or discoloration.    Neurological (>12):  MS: Awake, alert, oriented to person, place, situation, time. Normal affect. Follows all commands.    Language: Speech is clear, fluent      CNs: PERRL (R = 4mm, L = 4mm). VFF. EOMI no nystagmus, no diplopia. V1-3 intact to LT. No facial asymmetry b/l. Tongue midline, normal movements, no atrophy.    Motor: Normal muscle bulk & tone. No noticeable tremor or seizure. No pronator drift.              Deltoid	Biceps	Triceps	Wrist	Finger ABd	   R	5	5	5	5	5		5 	  L	5	5	5	5	5		5    	H-Flex	K-Flex	K-Ext	D-Flex	P-Flex  R	5	5	5	5	5	   L	5	5	5	5	5		     Sensation: Intact to LT    Reflexes:              Biceps(C5)       BR(C6)     Triceps(C7)               Patellar(L4)    Achilles(S1)    Plantar Resp  R	2	          2	             2		        1		    0		mute  L	2	          2	             2		        2		    0		mute    Coordination: intact rapid-alt movements. No dysmetria to FTN     Gait: Not assessed.    LABORATORY:  CBC                       13.0   4.84  )-----------( 184      ( 14 Dec 2023 06:03 )             39.7     Chem 12-14    137  |  105  |  16  ----------------------------<  92  4.5   |  21<L>  |  0.99    Ca    9.0      14 Dec 2023 06:03  Phos  3.7     12-14  Mg     2.2     12-14    TPro  8.4<H>  /  Alb  3.7  /  TBili  0.6  /  DBili  x   /  AST  35  /  ALT  37  /  AlkPhos  58  12-14    LFTs LIVER FUNCTIONS - ( 14 Dec 2023 06:03 )  Alb: 3.7 g/dL / Pro: 8.4 g/dL / ALK PHOS: 58 U/L / ALT: 37 U/L / AST: 35 U/L / GGT: x           Coagulopathy   Lipid Panel   A1c   Cardiac enzymes     U/A Urinalysis Basic - ( 14 Dec 2023 06:03 )    Color: x / Appearance: x / SG: x / pH: x  Gluc: 92 mg/dL / Ketone: x  / Bili: x / Urobili: x   Blood: x / Protein: x / Nitrite: x   Leuk Esterase: x / RBC: x / WBC x   Sq Epi: x / Non Sq Epi: x / Bacteria: x        STUDIES & IMAGING:      MR Cervical Spine w/wo contrast:  Abnormal enhancement along multiple bilateral cervical nerve roots with   dorsal distribution, thin/non-masslike and most consistent with a   polyneuritis.    Distribution is dorsal/sensory and fits symptoms as probable relapse of   GBS-sensory variant, versus other inflammatory condition. Correlate with   CSF sampling.    No cord compression, abnormal signal or definitive enhancement to   indicate myelitis.    MR Thoracic Spine w/wo contrast:  No imaging evidence of Guillain [place] syndrome.    MR Lumbar Spine w/wo contrast:  No imaging evidence of Guillain [place] syndrome.  Left foraminal disc herniation at the L5-S1 level.

## 2023-12-15 NOTE — DISCHARGE NOTE PROVIDER - NSFOLLOWUPCLINICS_GEN_ALL_ED_FT
[facility]  Hematology/Oncology  [address] [id number]  Phone: [phone]  Fax:      [facility]  Hematology/Oncology  [address] [id number]  Phone: [phone]  Fax:

## 2023-12-15 NOTE — DISCHARGE NOTE NURSING/CASE MANAGEMENT/SOCIAL WORK - PATIENT PORTAL LINK FT
You can access the FollowMyHealth Patient Portal offered by [facility] by registering at the following website: [email or web address]. By joining [employer]’s FollowMyHealth portal, you will also be able to view your health information using other applications (apps) compatible with our system. You can access the FollowMyHealth Patient Portal offered by [facility] by registering at the following website: [email or web address]. By joining [employer]’s FollowMyHealth portal, you will also be able to view your health information using other applications (apps) compatible with our system.

## 2023-12-15 NOTE — DISCHARGE NOTE PROVIDER - HOSPITAL COURSE
HPI:  Patient MICHELLE ARIAS is a 43y (1980) RIGHT handed woman who presented to [facility] ED on 12/8/2023, with c/o tingling in the b/l fingertips and feet since Thursday (11/30/2023).    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 Wong.  Patient reports that today was her first visit with Dr. Wong.  Patient had seen her primary care physician earlier in the week and s/he had referred her to an outpatient neurologist.  This neurologist was unable to see patient until 1/2024, so she found Dr. Wong online.  Patient is seeing an outpatient neurologist because she has been having numbness and tingling in her feet and hands, ongoing since Thursday (11/30/2023) of last week.  Patient reports that she started her menses on 11/30 and was dealing with cramping.  She called off from work and was in bed all day.  She says she began feeling numbness and tingling in her feet, namely in her toes.  She describes this as reduced sensation as well as pins-and-needles.  She says the sensation is akin to your foot falling asleep and trying to wake up.  Patient says that since then she has had worsening numbness on the plantar surfaces of her feet, the dorsal surfaces of her feet, and she is beginning to feel numbness in the lower legs.  She says that the toes are the worst, followed by the plantar surfaces of her feet, then the dorsal surfaces, and then the legs.  Patient says earlier this week she began feeling numbness and tingling in the tips of her fingers. Reports that her feet feel cold. Symptoms are during the entire day - not worse at night.    Patient is particularly concerned about her symptoms because she had a diagnosis of GBS at age 17.  Patient reports that the suspected trigger was pregnancy.  The pregnancy was ultimately terminated.  Patient reports that she underwent blood work, MRI, lumbar puncture.  She was admitted to the hospital for a week and a half, where she underwent plasma exchange.  She reports that she then spent 1.5 months in physical therapy to fully recover. Says she has always had balance issues since the initial episode.  She reports that her symptoms of the initial episode began similarly with numbness and tingling in her feet.  She began plasma exchange relatively quickly.  However, she reports that by the end of the plasma exchange, the numbness had spread up her legs and she was appreciably weak in both of her legs and her arms after plasma exchange.  Patient reports that there is no chance that she could be pregnant right now.      Patient denies a history of any medical problems other than a history of AIDP and abnormal Pap treated with LEEP procedure. Denies history of stroke/MI.  Reports she does not take any medications.  She denies use of blood thinners or aspirin.  She does not use any assistive devices at baseline.  She is able to climb stairs without issues.  Patient reports that she drives.  Patient does not use tobacco.  Patient drinks socially.  Patient denies use of recreational drugs.  Patient reports that she lives alone.  She has no children.  Patient works as a  at [employer]. Patient denies a family history of AIDP.  There is no history of demyelinating diseases in her family to her knowledge.  She denies family history of rheumatologic issues.    Patient denies any recent illness including diarrheal illness.  She has not received any recent vaccines.  She has not had any recent travel, no sick contacts.  No changes in medications.  Patient reports that she had a LEEP procedure earlier this year for an abnormal Pap smear, otherwise she has had no new medical issues prior to this past week.  She does not regularly see a physician.   Patient denies fevers and chills. Denies headache, neck pain, chest pain, shortness of breath, abdominal pain, nausea, vomiting, diarrhea, dysuria, frequency, hematuria.  Reports normal bowel movements.  Reports normal urination.    Impression: Paresthesias/numbness affecting the bilateral hands/feet similar to her prior episode of AIDP, possibly recrudescence of her prior AIDP symptoms vs. ?recurrent AIDP vs. other peripheral neuropathy    Hospital Course:  MR C/T/L spine w/wo IV contrast - reviewed with neuroradiology on 12/11/23, unremarkable.  Pt received LP on 12/11/23. CSF  studies with CC <1, prot 54, glu 59, clx neg, IgG 0.3 wnl, IgG index 0.6, WNV IgG+/IgM-, lyme IgG-/IgM-  IVIG 0.5g/kg daily for 4 days (12/12 -12/15).  Labs: ganglioside Abs panel, paraneoplastic autoantibody panel, A1c 5.6, UA with pyuria but no bacteria, UTox neg, ferritin, total iron with TIBC, ACE, vitamin B1/B6/B12/D/E, folate, Lyme, WNV, copper, zinc, CK, ESR, CRP, TSH, T3/T4, RPR, RVP  Rheumatologic workup: KIESHA, dsDNA, SSA/SSB, ANCA and RF negative  Kappa/lambra ratio 2.79 (high) but immunofixation and SPEP normal, possibly in setting of inflammation. Pt sent with outpatient hematology f/u.  Pt received Bactrim 3 days for dysuria/UTI  Pt planned for outpatient f/u with her neurologist Dr. Tomasa Wong and Dr. Nino Daley. May consider outpatient EMG/NCS.   HPI:  Patient MICHELLE ARIAS is a 43y (1980) RIGHT handed woman who presented to [facility] ED on 12/8/2023, with c/o tingling in the b/l fingertips and feet since Thursday (11/30/2023).    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 Wong.  Patient reports that today was her first visit with Dr. Wong.  Patient had seen her primary care physician earlier in the week and s/he had referred her to an outpatient neurologist.  This neurologist was unable to see patient until 1/2024, so she found Dr. Wong online.  Patient is seeing an outpatient neurologist because she has been having numbness and tingling in her feet and hands, ongoing since Thursday (11/30/2023) of last week.  Patient reports that she started her menses on 11/30 and was dealing with cramping.  She called off from work and was in bed all day.  She says she began feeling numbness and tingling in her feet, namely in her toes.  She describes this as reduced sensation as well as pins-and-needles.  She says the sensation is akin to your foot falling asleep and trying to wake up.  Patient says that since then she has had worsening numbness on the plantar surfaces of her feet, the dorsal surfaces of her feet, and she is beginning to feel numbness in the lower legs.  She says that the toes are the worst, followed by the plantar surfaces of her feet, then the dorsal surfaces, and then the legs.  Patient says earlier this week she began feeling numbness and tingling in the tips of her fingers. Reports that her feet feel cold. Symptoms are during the entire day - not worse at night.    Patient is particularly concerned about her symptoms because she had a diagnosis of GBS at age 17.  Patient reports that the suspected trigger was pregnancy.  The pregnancy was ultimately terminated.  Patient reports that she underwent blood work, MRI, lumbar puncture.  She was admitted to the hospital for a week and a half, where she underwent plasma exchange.  She reports that she then spent 1.5 months in physical therapy to fully recover. Says she has always had balance issues since the initial episode.  She reports that her symptoms of the initial episode began similarly with numbness and tingling in her feet.  She began plasma exchange relatively quickly.  However, she reports that by the end of the plasma exchange, the numbness had spread up her legs and she was appreciably weak in both of her legs and her arms after plasma exchange.  Patient reports that there is no chance that she could be pregnant right now.      Patient denies a history of any medical problems other than a history of AIDP and abnormal Pap treated with LEEP procedure. Denies history of stroke/MI.  Reports she does not take any medications.  She denies use of blood thinners or aspirin.  She does not use any assistive devices at baseline.  She is able to climb stairs without issues.  Patient reports that she drives.  Patient does not use tobacco.  Patient drinks socially.  Patient denies use of recreational drugs.  Patient reports that she lives alone.  She has no children.  Patient works as a  at [employer]. Patient denies a family history of AIDP.  There is no history of demyelinating diseases in her family to her knowledge.  She denies family history of rheumatologic issues.    Patient denies any recent illness including diarrheal illness.  She has not received any recent vaccines.  She has not had any recent travel, no sick contacts.  No changes in medications.  Patient reports that she had a LEEP procedure earlier this year for an abnormal Pap smear, otherwise she has had no new medical issues prior to this past week.  She does not regularly see a physician.   Patient denies fevers and chills. Denies headache, neck pain, chest pain, shortness of breath, abdominal pain, nausea, vomiting, diarrhea, dysuria, frequency, hematuria.  Reports normal bowel movements.  Reports normal urination.    Impression: Paresthesias/numbness affecting the bilateral hands/feet similar to her prior episode of AIDP, possibly recrudescence of her prior AIDP symptoms vs. ?recurrent AIDP vs. other peripheral neuropathy    Hospital Course:  MR C/T/L spine w/wo IV contrast - reviewed with neuroradiology on 12/11/23, unremarkable.  Pt received LP on 12/11/23. CSF  studies with CC <1, prot 54, glu 59, clx neg, IgG 0.3 wnl, IgG index 0.6, WNV IgG+/IgM-, lyme IgG-/IgM-  IVIG 0.5g/kg daily for 4 days (12/12 -12/15).  Labs: ganglioside Abs panel, paraneoplastic autoantibody panel, A1c 5.6, UA with pyuria but no bacteria, UTox neg, ferritin, total iron with TIBC, ACE, vitamin B1/B6/B12/D/E, folate, Lyme, WNV, copper, zinc, CK, ESR, CRP, TSH, T3/T4, RPR, RVP  Rheumatologic workup: KIESHA, dsDNA, SSA/SSB, ANCA and RF negative  Kappa/lambra ratio 2.79 (high) but immunofixation and SPEP normal, possibly in setting of inflammation. Pt sent with outpatient hematology f/u.  Pt received Bactrim 3 days for dysuria/UTI  Pt planned for outpatient f/u with her neurologist Dr. Tomasa Wong and Dr. Nino Daley. May consider outpatient EMG/NCS.   HPI:  Patient MICHELLE ARIAS is a 43y (1980) RIGHT handed woman w/ PMH significant for AIDP (dx age 17 while pregnant, tx PLEX, symptoms resolved) and abnormal Pap smear - treated with LEEP, who presented to [facility] ED on 12/8/2023, with c/o tingling in the b/l fingertips and feet since Thursday (11/30/2023).    Patient reports to [facility] emergency department at the behest of her outpatient neurologist, Dr. Wong.  Patient reports that today was her first visit with Dr. Wong.  Patient had seen her primary care physician earlier in the week and s/he had referred her to an outpatient neurologist.  This neurologist was unable to see patient until 1/2024, so she found Dr. Wong online.  Patient is seeing an outpatient neurologist because she has been having numbness and tingling in her feet and hands, ongoing since Thursday (11/30/2023) of last week.  Patient reports that she started her menses on 11/30 and was dealing with cramping.  She called off from work and was in bed all day.  She says she began feeling numbness and tingling in her feet, namely in her toes.  She describes this as reduced sensation as well as pins-and-needles.  She says the sensation is akin to your foot falling asleep and trying to wake up.  Patient says that since then she has had worsening numbness on the plantar surfaces of her feet, the dorsal surfaces of her feet, and she is beginning to feel numbness in the lower legs.  She says that the toes are the worst, followed by the plantar surfaces of her feet, then the dorsal surfaces, and then the legs.  Patient says earlier this week she began feeling numbness and tingling in the tips of her fingers. Reports that her feet feel cold. Symptoms are during the entire day - not worse at night.    Patient is particularly concerned about her symptoms because she had a diagnosis of GBS at age 17.  Patient reports that the suspected trigger was pregnancy.  The pregnancy was ultimately terminated.  Patient reports that she underwent blood work, MRI, lumbar puncture.  She was admitted to the hospital for a week and a half, where she underwent plasma exchange.  She reports that she then spent 1.5 months in physical therapy to fully recover. Says she has always had balance issues since the initial episode.  She reports that her symptoms of the initial episode began similarly with numbness and tingling in her feet.  She began plasma exchange relatively quickly.  However, she reports that by the end of the plasma exchange, the numbness had spread up her legs and she was appreciably weak in both of her legs and her arms after plasma exchange.  Patient reports that there is no chance that she could be pregnant right now.      Patient denies a history of any medical problems other than a history of AIDP and abnormal Pap treated with LEEP procedure. Denies history of stroke/MI.  Reports she does not take any medications.  She denies use of blood thinners or aspirin.  She does not use any assistive devices at baseline.  She is able to climb stairs without issues.  Patient reports that she drives.  Patient does not use tobacco.  Patient drinks socially.  Patient denies use of recreational drugs.  Patient reports that she lives alone.  She has no children.  Patient works as a  at [employer]. Patient denies a family history of AIDP.  There is no history of demyelinating diseases in her family to her knowledge.  She denies family history of rheumatologic issues.    Patient denies any recent illness including diarrheal illness.  She has not received any recent vaccines.  She has not had any recent travel, no sick contacts.  No changes in medications.  Patient reports that she had a LEEP procedure earlier this year for an abnormal Pap smear, otherwise she has had no new medical issues prior to this past week.  She does not regularly see a physician.   Patient denies fevers and chills. Denies headache, neck pain, chest pain, shortness of breath, abdominal pain, nausea, vomiting, diarrhea, dysuria, frequency, hematuria.  Reports normal bowel movements.  Reports normal urination.    Impression: Paresthesias/numbness affecting the bilateral hands/feet similar to her prior episode of AIDP, possibly recrudescence of her prior AIDP symptoms vs. ?recurrent AIDP vs. other peripheral neuropathy    Hospital Course:  MR C/T/L spine w/wo IV contrast - reviewed again with neuroradiology on 12/11/23, unremarkable despite initial read of abnormal enhancement of cervical nerve roots.  Pt received LP on 12/11/23. CSF  studies with CC <1, prot 54, glu 59, clx neg, IgG 0.3 wnl, IgG index 0.6, WNV IgG+/IgM-, lyme IgG-/IgM-  IVIG 0.5g/kg daily for 4 days (12/12 -12/15).  Labs pending: ganglioside Abs panel, paraneoplastic autoantibody panel, A1c 5.6, UA with pyuria but no bacteria, UTox neg, ferritin, total iron with TIBC, ACE, vitamin B1/B6/B12/D/E, folate, Lyme, WNV, copper, zinc, CK, ESR, CRP, TSH, T3/T4, RPR, RVP  Rheumatologic workup: KIESHA, dsDNA, SSA/SSB, ANCA and RF negative  Kappa/lambra ratio 2.79 (high) but immunofixation and SPEP normal, possibly in setting of inflammation. Pt sent with outpatient hematology f/u.  Pt received Bactrim 3 days for dysuria/UTI  Pt planned for outpatient f/u with her neurologist Dr. Tomasa Wong and Dr. Nino Daley. May consider outpatient EMG/NCS.    MR Cervical Spine w/wo contrast:  Abnormal enhancement along multiple bilateral cervical nerve roots with   dorsal distribution, thin/non-masslike and most consistent with a   polyneuritis.  Distribution is dorsal/sensory and fits symptoms as probable relapse of   GBS-sensory variant, versus other inflammatory condition. Correlate with   CSF sampling.  No cord compression, abnormal signal or definitive enhancement to   indicate myelitis.    MR Thoracic Spine w/wo contrast:  No imaging evidence of Guillain Ulysses syndrome.    MR Lumbar Spine w/wo contrast:  No imaging evidence of Guillain Ulysses syndrome.  Left foraminal disc herniation at the L5-S1 level.       HPI:  Patient MICHELLE ARIAS is a 43y (1980) RIGHT handed woman w/ PMH significant for AIDP (dx age 17 while pregnant, tx PLEX, symptoms resolved) and abnormal Pap smear - treated with LEEP, who presented to [facility] ED on 12/8/2023, with c/o tingling in the b/l fingertips and feet since Thursday (11/30/2023).    Patient reports to [facility] emergency department at the behest of her outpatient neurologist, Dr. Wong.  Patient reports that today was her first visit with Dr. Wong.  Patient had seen her primary care physician earlier in the week and s/he had referred her to an outpatient neurologist.  This neurologist was unable to see patient until 1/2024, so she found Dr. Wong online.  Patient is seeing an outpatient neurologist because she has been having numbness and tingling in her feet and hands, ongoing since Thursday (11/30/2023) of last week.  Patient reports that she started her menses on 11/30 and was dealing with cramping.  She called off from work and was in bed all day.  She says she began feeling numbness and tingling in her feet, namely in her toes.  She describes this as reduced sensation as well as pins-and-needles.  She says the sensation is akin to your foot falling asleep and trying to wake up.  Patient says that since then she has had worsening numbness on the plantar surfaces of her feet, the dorsal surfaces of her feet, and she is beginning to feel numbness in the lower legs.  She says that the toes are the worst, followed by the plantar surfaces of her feet, then the dorsal surfaces, and then the legs.  Patient says earlier this week she began feeling numbness and tingling in the tips of her fingers. Reports that her feet feel cold. Symptoms are during the entire day - not worse at night.    Patient is particularly concerned about her symptoms because she had a diagnosis of GBS at age 17.  Patient reports that the suspected trigger was pregnancy.  The pregnancy was ultimately terminated.  Patient reports that she underwent blood work, MRI, lumbar puncture.  She was admitted to the hospital for a week and a half, where she underwent plasma exchange.  She reports that she then spent 1.5 months in physical therapy to fully recover. Says she has always had balance issues since the initial episode.  She reports that her symptoms of the initial episode began similarly with numbness and tingling in her feet.  She began plasma exchange relatively quickly.  However, she reports that by the end of the plasma exchange, the numbness had spread up her legs and she was appreciably weak in both of her legs and her arms after plasma exchange.  Patient reports that there is no chance that she could be pregnant right now.      Patient denies a history of any medical problems other than a history of AIDP and abnormal Pap treated with LEEP procedure. Denies history of stroke/MI.  Reports she does not take any medications.  She denies use of blood thinners or aspirin.  She does not use any assistive devices at baseline.  She is able to climb stairs without issues.  Patient reports that she drives.  Patient does not use tobacco.  Patient drinks socially.  Patient denies use of recreational drugs.  Patient reports that she lives alone.  She has no children.  Patient works as a  at [employer]. Patient denies a family history of AIDP.  There is no history of demyelinating diseases in her family to her knowledge.  She denies family history of rheumatologic issues.    Patient denies any recent illness including diarrheal illness.  She has not received any recent vaccines.  She has not had any recent travel, no sick contacts.  No changes in medications.  Patient reports that she had a LEEP procedure earlier this year for an abnormal Pap smear, otherwise she has had no new medical issues prior to this past week.  She does not regularly see a physician.   Patient denies fevers and chills. Denies headache, neck pain, chest pain, shortness of breath, abdominal pain, nausea, vomiting, diarrhea, dysuria, frequency, hematuria.  Reports normal bowel movements.  Reports normal urination.    Impression: Paresthesias/numbness affecting the bilateral hands/feet similar to her prior episode of AIDP, possibly recrudescence of her prior AIDP symptoms vs. ?recurrent AIDP vs. other peripheral neuropathy    Hospital Course:  MR C/T/L spine w/wo IV contrast - reviewed again with neuroradiology on 12/11/23, unremarkable despite initial read of abnormal enhancement of cervical nerve roots.  Pt received LP on 12/11/23. CSF  studies with CC <1, prot 54, glu 59, clx neg, IgG 0.3 wnl, IgG index 0.6, WNV IgG+/IgM-, lyme IgG-/IgM-  IVIG 0.5g/kg daily for 4 days (12/12 -12/15).  Labs pending: ganglioside Abs panel, paraneoplastic autoantibody panel, A1c 5.6, UA with pyuria but no bacteria, UTox neg, ferritin, total iron with TIBC, ACE, vitamin B1/B6/B12/D/E, folate, Lyme, WNV, copper, zinc, CK, ESR, CRP, TSH, T3/T4, RPR, RVP  Rheumatologic workup: KIESHA, dsDNA, SSA/SSB, ANCA and RF negative  Kappa/lambra ratio 2.79 (high) but immunofixation and SPEP normal, possibly in setting of inflammation. Pt sent with outpatient hematology f/u.  Pt received Bactrim 3 days for dysuria/UTI  Pt planned for outpatient f/u with her neurologist Dr. Tomasa Wong and Dr. Nino Daley. May consider outpatient EMG/NCS.    MR Cervical Spine w/wo contrast:  Abnormal enhancement along multiple bilateral cervical nerve roots with   dorsal distribution, thin/non-masslike and most consistent with a   polyneuritis.  Distribution is dorsal/sensory and fits symptoms as probable relapse of   GBS-sensory variant, versus other inflammatory condition. Correlate with   CSF sampling.  No cord compression, abnormal signal or definitive enhancement to   indicate myelitis.    MR Thoracic Spine w/wo contrast:  No imaging evidence of Guillain Wisner syndrome.    MR Lumbar Spine w/wo contrast:  No imaging evidence of Guillain Wisner syndrome.  Left foraminal disc herniation at the L5-S1 level.

## 2023-12-15 NOTE — PROGRESS NOTE ADULT - ATTENDING COMMENTS
Patient seen and examined - agree with exam and plan as document.
Patient seen and examined - neurologically stable - tolerating IVIG without difficulty.   Continue current regimen - encourage walking.
Patient seen and examined with housestaff - labs reviewed - agree with exam and plan as documented.
Patient seen and examined with housestaff on am rounds - agree with above assessment and plan.
Patient seen and examined - history reviewed - patient with mild symptoms and signs - agree with LP today to rule out GBS.

## 2023-12-16 LAB
CULTURE RESULTS: NO GROWTH — SIGNIFICANT CHANGE UP
CULTURE RESULTS: NO GROWTH — SIGNIFICANT CHANGE UP
OLIGOCLONAL BANDS CSF ELPH-IMP: SIGNIFICANT CHANGE UP
OLIGOCLONAL BANDS CSF ELPH-IMP: SIGNIFICANT CHANGE UP
SPECIMEN SOURCE: SIGNIFICANT CHANGE UP
SPECIMEN SOURCE: SIGNIFICANT CHANGE UP

## 2024-02-28 PROBLEM — G61.0 GUILLAIN-BARRE SYNDROME: Chronic | Status: ACTIVE | Noted: 2023-12-08

## 2024-05-14 PROBLEM — Z00.00 ENCOUNTER FOR PREVENTIVE HEALTH EXAMINATION: Status: ACTIVE | Noted: 2024-05-14

## 2024-05-15 ENCOUNTER — LABORATORY RESULT (OUTPATIENT)
Age: 44
End: 2024-05-15

## 2024-05-15 ENCOUNTER — NON-APPOINTMENT (OUTPATIENT)
Age: 44
End: 2024-05-15

## 2024-05-15 ENCOUNTER — APPOINTMENT (OUTPATIENT)
Age: 44
End: 2024-05-15
Payer: COMMERCIAL

## 2024-05-15 VITALS
HEIGHT: 67 IN | DIASTOLIC BLOOD PRESSURE: 86 MMHG | BODY MASS INDEX: 24.96 KG/M2 | WEIGHT: 159 LBS | HEART RATE: 66 BPM | SYSTOLIC BLOOD PRESSURE: 133 MMHG

## 2024-05-15 DIAGNOSIS — G61.81 CHRONIC INFLAMMATORY DEMYELINATING POLYNEURITIS: ICD-10-CM

## 2024-05-15 LAB
ALBUMIN SERPL ELPH-MCNC: 4.6 G/DL
ALP BLD-CCNC: 70 U/L
ALT SERPL-CCNC: 29 U/L
ANION GAP SERPL CALC-SCNC: 12 MMOL/L
AST SERPL-CCNC: 26 U/L
BASOPHILS # BLD AUTO: 0.02 K/UL
BASOPHILS NFR BLD AUTO: 0.4 %
BILIRUB SERPL-MCNC: 0.7 MG/DL
BUN SERPL-MCNC: 9 MG/DL
CALCIUM SERPL-MCNC: 9.3 MG/DL
CHLORIDE SERPL-SCNC: 104 MMOL/L
CO2 SERPL-SCNC: 23 MMOL/L
CREAT SERPL-MCNC: 0.76 MG/DL
EGFR: 99 ML/MIN/1.73M2
EOSINOPHIL # BLD AUTO: 0.03 K/UL
EOSINOPHIL NFR BLD AUTO: 0.6 %
ESTIMATED AVERAGE GLUCOSE: 123 MG/DL
GLUCOSE SERPL-MCNC: 88 MG/DL
HBA1C MFR BLD HPLC: 5.9 %
HCT VFR BLD CALC: 37.1 %
HGB BLD-MCNC: 12.6 G/DL
IGG SER QL IEP: 1361 MG/DL
IGG1 SER-MCNC: 822 MG/DL
IGG2 SER-MCNC: 451 MG/DL
IGG3 SER-MCNC: 24.1 MG/DL
IGG4 SER-MCNC: 39.3 MG/DL
IMM GRANULOCYTES NFR BLD AUTO: 0.2 %
LYMPHOCYTES # BLD AUTO: 2.31 K/UL
LYMPHOCYTES NFR BLD AUTO: 48.9 %
MAN DIFF?: NORMAL
MCHC RBC-ENTMCNC: 28.7 PG
MCHC RBC-ENTMCNC: 34 GM/DL
MCV RBC AUTO: 84.5 FL
MONOCYTES # BLD AUTO: 0.28 K/UL
MONOCYTES NFR BLD AUTO: 5.9 %
NEUTROPHILS # BLD AUTO: 2.07 K/UL
NEUTROPHILS NFR BLD AUTO: 44 %
PLATELET # BLD AUTO: 232 K/UL
POTASSIUM SERPL-SCNC: 4.1 MMOL/L
PROT SERPL-MCNC: 7.6 G/DL
RBC # BLD: 4.39 M/UL
RBC # FLD: 14.8 %
SODIUM SERPL-SCNC: 140 MMOL/L
TSH SERPL-ACNC: 1.53 UIU/ML
WBC # FLD AUTO: 4.72 K/UL

## 2024-05-15 PROCEDURE — 99204 OFFICE O/P NEW MOD 45 MIN: CPT

## 2024-05-15 RX ORDER — IMMUNE GLOBULIN INFUSION (HUMAN) 100 MG/ML
10 INJECTION, SOLUTION INTRAVENOUS; SUBCUTANEOUS
Qty: 14 | Refills: 11 | Status: ACTIVE | COMMUNITY
Start: 2024-05-15 | End: 1900-01-01

## 2024-05-15 NOTE — PHYSICAL EXAM
[FreeTextEntry1] : Constitutional:  Patient was well-developed, well-nourished and in no acute distress.   Head:  Normocephalic, atraumatic. Tympanic membranes were clear.   Neck:  Supple with full range of motion.   Cardiovascular:  Cardiac rhythm was regular without murmur. There were no carotid bruits. Peripheral pulses were full and symmetric.   Respiratory:  Lungs were clear.   Abdomen:  Soft and nontender.   Spine:  Nontender.  There was no pain on straight leg raising.  Alvin's maneuver was negative.  Skin:  There were no rashes.   NEUROLOGICAL EXAMINATION:  Mental Status: Patient was alert and oriented. Speech was fluent. There was no dysarthria.   Cranial Nerves:   II: Visual acuity was 20/30-1 bilaterally with near card. Pupils were equal and reactive. Visual fields were full. Funduscopic examination was normal.   III, IV, VI:  Eye movements were full without nystagmus.   V: Facial sensation was intact.   VII: Facial strength was normal.   VIII: Hearing was equal.   IX, X: Palatal movement was normal. Phonation was normal.   XI: Sternocleidomastoids and trapezii were normal.   XII: Tongue was midline and movements normal. There was no lingual atrophy or fasciculations.   Motor Examination: Muscle bulk, tone and strength were normal.  There was a mild postural tremor in her outstretched hands.  Sensory Examination: There was mild shading of pinprick in a stocking distribution.  Joint position sense was preserved in the toes.  There was minimal decreased vibration sense in the toes.  Reflexes: DTRs were 2 throughout except for the ankle jerks which were 1+.   Plantar Responses: Plantar responses were flexor.   Coordination/Cerebellar Function: There was no dysmetria on finger to nose or heel to shin testing.   Gait/Stance: Gait and tandem were normal. Romberg was negative.

## 2024-05-15 NOTE — CONSULT LETTER
[Dear  ___] : Dear  [unfilled], [Consult Letter:] : I had the pleasure of evaluating your patient, [unfilled]. [Please see my note below.] : Please see my note below. [Consult Closing:] : Thank you very much for allowing me to participate in the care of this patient.  If you have any questions, please do not hesitate to contact me. [Sincerely,] : Sincerely, [FreeTextEntry3] : Abner Daley M.D.  [DrAracelis  ___] : Dr. RAMIREZ

## 2024-05-15 NOTE — REVIEW OF SYSTEMS
[Tingling] : tingling [Abnormal Sensation] : an abnormal sensation [Ataxia] : ataxia [Negative] : Heme/Lymph

## 2024-05-15 NOTE — HISTORY OF PRESENT ILLNESS
[FreeTextEntry1] : Ms. Ileana Santana is a 43-year-old right-handed patient who was referred for neurologic evaluation at your kind suggestion.  At age 17 early during a pregnancy, she developed tingling of her feet and lower extremity muscle fatigue.  She was hospitalized in [place], [place] and was diagnosed with Guillain-Lancaster syndrome.  She underwent plasma exchange during which she developed numbness of her arms.  Her symptoms resolved completely within 6 months.  Her pregnancy was terminated at 5 months.  She was well until late November 2023 when she developed similar tingling in her distal lower extremities.  She was evaluated by Dr. Tomasa Wong, a neurologist and referred to the emergency room at [facility].  She underwent lumbar puncture which revealed acellular fluid with a mildly elevated protein of 56 and glucose of 59.  Oligoclonal bands were absent.  IgG index was 0.6.  CSF West Nile IgG was positive and IgM was negative.  She underwent MR imaging of the cervical, thoracic and lumbar spine which revealed enhancement of the cervical dorsal roots.  She was treated with IVIG with resolution of all symptoms after about 2 weeks.  Serum immunofixation revealed no monoclonal band.  Kappa lambda free light chain ratio was 2.79.  In late January 2024, she noticed recurrence of tingling in her distal feet.  In February, she noted a feeling of swollen feet and fingers.  Her symptoms worsened when overheated.  She was evaluated again by her neurologist.  Lower extremity nerve conduction studies performed in April 2024 revealed markedly prolonged motor distal latencies and very slowed conduction velocities.  There was no motor conduction block.  F waves and H-reflexes were prolonged.  Sensory nerves were nonreactive.  She was referred again to the ER but instead came here to a scheduled appointment.  She suffers from probable common migraine.  She had a recently abnormal Pap smear and has HPV.  She was treated with a LEEP.  She denies cognitive, visual, hearing, swallowing or sphincteric difficulties.  She has had chronic mild imbalance dating back to age 17.  There is no family history of neuropathy.  Past surgical history is unremarkable.  She suffers from migraine.  There is no history of hypertension, diabetes, hyperlipidemia, cardiac, pulmonary, renal, hepatic, gastrointestinal, thyroid, hematologic or cerebrovascular disease.  She has no allergies.  She takes several vitamins including B6.  She is a non-smoker and social drinker.  She is partnered.  She has no children.  Family history is notable for parents with diabetes.  She has 3 healthy sisters.

## 2024-05-15 NOTE — ASSESSMENT
[FreeTextEntry1] :  Ms. Santana is a 43-year-old who has experienced recurrent episodes of predominantly distal paresthesias since age 17.  A recent lumbar puncture revealed cytoalbuminemic dissociation and MRI revealed enhancing dorsal cervical nerve roots.  Her symptoms remitted with treatment with IVIG.  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  There was no evidence of an IgM gammopathy.  I suggested additional blood tests including repeat immunoglobulin studies, anti-MAG and ganglioside antibodies.  I will arrange treatment with Gammagard 2 g/kg to be administered over 5 days.  She will retrieve her upper extremity nerve conduction studies for review.  Further management will depend upon the blood test results and her clinical course.  I will keep you informed of her status.

## 2024-05-16 LAB
HBV SURFACE AG SER QL: NONREACTIVE
HCV AB SER QL: NONREACTIVE
HCV S/CO RATIO: 0.13 S/CO
IGA 24H UR QL IFE: NORMAL

## 2024-05-18 LAB
ALBUMIN MFR SERPL ELPH: 56.3 %
ALBUMIN SERPL-MCNC: 4.3 G/DL
ALBUMIN/GLOB SERPL: 1.3 RATIO
ALPHA1 GLOB MFR SERPL ELPH: 3.6 %
ALPHA1 GLOB SERPL ELPH-MCNC: 0.3 G/DL
ALPHA2 GLOB MFR SERPL ELPH: 9.7 %
ALPHA2 GLOB SERPL ELPH-MCNC: 0.7 G/DL
ASIALO-GM1 ANTIBODIES, IGG/IGM: 6 IV
B-GLOBULIN MFR SERPL ELPH: 12.6 %
B-GLOBULIN SERPL ELPH-MCNC: 1 G/DL
DEPRECATED KAPPA LC FREE/LAMBDA SER: 2.09 RATIO
GAMMA GLOB FLD ELPH-MCNC: 1.4 G/DL
GAMMA GLOB MFR SERPL ELPH: 17.8 %
GD1A ANTIBODIES, IGG/IGM: 7 IV
GD1B ANTIBODIES, IGG/IGM: 6 IV
GM1 ANTIBODIES, IGG/IGM: 6 IV
GM2 ANTIBODIES, IGG/IGM: 6 IV
GQ1B ANTIBODIES, IGG/IGM: 7 IV
IGA SER QL IEP: 229 MG/DL
IGG SER QL IEP: 1361 MG/DL
IGM SER QL IEP: 64 MG/DL
INTERPRETATION SERPL IEP-IMP: NORMAL
KAPPA LC CSF-MCNC: 0.76 MG/DL
KAPPA LC SERPL-MCNC: 1.59 MG/DL
M PROTEIN SPEC IFE-MCNC: NORMAL
PROT SERPL-MCNC: 7.6 G/DL
PROT SERPL-MCNC: 7.6 G/DL

## 2024-05-23 LAB — MAG AB SER QL: NEGATIVE

## 2024-05-28 LAB
MISCELLANEOUS TEST: NORMAL
PROC NAME: NORMAL

## 2024-06-15 LAB — SENSORIMOTOR NEUROPATHY PROFILE W/ RECOMBX: NORMAL

## 2024-08-26 ENCOUNTER — APPOINTMENT (OUTPATIENT)
Dept: NEUROLOGY | Facility: CLINIC | Age: 44
End: 2024-08-26
Payer: COMMERCIAL

## 2024-08-26 VITALS
WEIGHT: 158 LBS | SYSTOLIC BLOOD PRESSURE: 122 MMHG | DIASTOLIC BLOOD PRESSURE: 86 MMHG | BODY MASS INDEX: 24.8 KG/M2 | HEIGHT: 67 IN | HEART RATE: 78 BPM

## 2024-08-26 DIAGNOSIS — G61.81 CHRONIC INFLAMMATORY DEMYELINATING POLYNEURITIS: ICD-10-CM

## 2024-08-26 PROCEDURE — 99213 OFFICE O/P EST LOW 20 MIN: CPT

## 2024-08-26 NOTE — HISTORY OF PRESENT ILLNESS
[FreeTextEntry1] : Ms. Ileana Santana returned to the office having been initially evaluated on May 15, 2024.  She is a 44-year-old right-handed patient who at age 17 early during a pregnancy developed tingling of her feet and lower extremity muscle fatigue.  She was hospitalized in [place], [place] and was diagnosed with Guillain-Arvin syndrome.  She underwent plasma exchange during which she developed numbness of her arms.  Her symptoms resolved completely within 6 months.  Her pregnancy was terminated at 5 months.  She was well until late November 2023 when she developed similar tingling in her distal lower extremities.  She was evaluated by Dr. Tomasa Wong, a neurologist and referred to the emergency room at [facility].  She underwent lumbar puncture which revealed acellular fluid with a mildly elevated protein of 56 and glucose of 59.  Oligoclonal bands were absent.  IgG index was 0.6.  CSF West Nile IgG was positive and IgM was negative.  She underwent MR imaging of the cervical, thoracic and lumbar spine which revealed enhancement of the cervical dorsal roots.  She was treated with IVIG with resolution of all symptoms after about 2 weeks.  Serum immunofixation revealed no monoclonal band.  Kappa lambda free light chain ratio was 2.79.  In late January 2024, she noticed recurrence of tingling in her distal feet.  In February, she noted a feeling of swollen feet and fingers.  Her symptoms worsened when overheated.  She was evaluated again by her neurologist.  Lower extremity nerve conduction studies performed in April 2024 revealed markedly prolonged motor distal latencies and very slowed conduction velocities.  There was no motor conduction block.  F waves and H-reflexes were prolonged.  Sensory nerves were nonreactive.  She was referred again to the ER but instead came here to a scheduled appointment.  She suffers from probable common migraine.  She had a recently abnormal Pap smear and has HPV.  She was treated with a LEEP.  She denies cognitive, visual, hearing, swallowing or sphincteric difficulties.  She has had chronic mild imbalance dating back to age 17.  There is no family history of neuropathy.  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  I suggested treatment with IVIG 2 g/kg.  She received infusions in early June, July and August.  She reports complete resolution of all her symptoms.  She underwent a comprehensive serologic evaluation.  Hemoglobin A1c was 5.9.  Kappa lambda free light chain ratio was 2.09.  Serum and urine immunofixation revealed no monoclonal band.  Hepatitis B and C serologies were negative.  Ganglioside, MAG, CASPR1 and neurofascin antibodies were negative.  Past surgical history is unremarkable.  She suffers from migraine.  There is no history of hypertension, diabetes, hyperlipidemia, cardiac, pulmonary, renal, hepatic, gastrointestinal, thyroid, hematologic or cerebrovascular disease.  She has no allergies.  She takes several vitamins including B6.  She is a non-smoker and social drinker.  She is partnered.  She has no children.  Family history is notable for parents with diabetes.  She has 3 healthy sisters.

## 2024-08-26 NOTE — ASSESSMENT
[FreeTextEntry1] :  Ms. Santana is a 44-year-old who suffers from CIDP.  I explained the pathophysiology and natural history of her disease.  She wishes to withhold her September IVIG treatment to determine whether her symptoms recur.  This is not unreasonable but I suspect that her symptoms will worsen.  I also discussed possible transitioning to subcutaneous Vyvgart.  She will require updated immunoglobulin studies in a few months to follow her kappa lambda free light chain ratio.  Further management will depend upon her clinical course.

## 2024-08-26 NOTE — PHYSICAL EXAM
[FreeTextEntry1] : Constitutional:  Patient was well-developed, well-nourished and in no acute distress.   Head:  Normocephalic, atraumatic. Tympanic membranes were not examined.   Neck:  Supple with full range of motion.   Cardiovascular:  Cardiac rhythm was regular without murmur. There were no carotid bruits. Peripheral pulses were full and symmetric.   Respiratory:  Lungs were clear.   Abdomen:  Soft and nontender.   Spine:  Nontender.  There was no pain on straight leg raising.  Alvin's maneuver was negative.  Skin:  There were no rashes.   NEUROLOGICAL EXAMINATION:  Mental Status: Patient was alert and oriented. Speech was fluent. There was no dysarthria.   Cranial Nerves:   II: She could finger count bilaterally. Pupils were equal and reactive. Visual fields were full.  Fundi were not examined.  III, IV, VI:  Eye movements were full without nystagmus.   V: Facial sensation was intact.   VII: Facial strength was normal.   VIII: Hearing was equal.   IX, X: Palatal movement was normal. Phonation was normal.   XI: Sternocleidomastoids and trapezii were normal.   XII: Tongue was midline and movements normal. There was no lingual atrophy or fasciculations.   Motor Examination: Muscle bulk, tone and strength were normal.  There was a mild postural tremor in her outstretched hands.  Sensory Examination: There was mild shading of pinprick in a stocking and glove distribution.  Joint position sense was preserved in the toes.  There was minimal decreased vibration sense in the toes.  Reflexes: DTRs were 2 throughout.   Plantar Responses: Plantar responses were flexor.   Coordination/Cerebellar Function: There was no dysmetria on finger to nose or heel to shin testing.   Gait/Stance: Gait and tandem were normal. Romberg was negative.

## 2024-11-12 ENCOUNTER — NON-APPOINTMENT (OUTPATIENT)
Age: 44
End: 2024-11-12

## 2025-01-06 ENCOUNTER — APPOINTMENT (OUTPATIENT)
Dept: NEUROLOGY | Facility: CLINIC | Age: 45
End: 2025-01-06
Payer: COMMERCIAL

## 2025-01-06 VITALS
WEIGHT: 155 LBS | DIASTOLIC BLOOD PRESSURE: 80 MMHG | SYSTOLIC BLOOD PRESSURE: 127 MMHG | HEART RATE: 89 BPM | HEIGHT: 67 IN | BODY MASS INDEX: 24.33 KG/M2

## 2025-01-06 DIAGNOSIS — G61.81 CHRONIC INFLAMMATORY DEMYELINATING POLYNEURITIS: ICD-10-CM

## 2025-01-06 PROCEDURE — 99213 OFFICE O/P EST LOW 20 MIN: CPT

## 2025-04-17 DIAGNOSIS — G61.81 CHRONIC INFLAMMATORY DEMYELINATING POLYNEURITIS: ICD-10-CM

## 2025-04-29 ENCOUNTER — APPOINTMENT (OUTPATIENT)
Dept: NEUROLOGY | Facility: CLINIC | Age: 45
End: 2025-04-29
Payer: COMMERCIAL

## 2025-04-29 DIAGNOSIS — G61.81 CHRONIC INFLAMMATORY DEMYELINATING POLYNEURITIS: ICD-10-CM

## 2025-04-29 PROCEDURE — 95911 NRV CNDJ TEST 9-10 STUDIES: CPT

## 2025-04-29 PROCEDURE — 95885 MUSC TST DONE W/NERV TST LIM: CPT

## 2025-05-01 ENCOUNTER — APPOINTMENT (OUTPATIENT)
Dept: NEUROLOGY | Facility: CLINIC | Age: 45
End: 2025-05-01

## 2025-05-22 NOTE — DISCHARGE NOTE NURSING/CASE MANAGEMENT/SOCIAL WORK - NSDCPEPT PROEDMA_GEN_ALL_CORE
LOV: 4/29/2025       Next Visit Date: 7/16/2025     ROUTE TO PCP FOR APPROVAL     Requested Prescriptions     Pending Prescriptions Disp Refills    ibuprofen (MOTRIN) 800 MG tablet [Pharmacy Med Name: IBUPROFEN 800MG TABLETS] 90 tablet 0     Sig: TAKE 1 TABLET BY MOUTH EVERY 8 HOURS AS NEEDED FOR PAIN OR FEVER     [id number] - [facility] [place] - [address] AT [facility]      Yes